# Patient Record
Sex: MALE | Race: WHITE
[De-identification: names, ages, dates, MRNs, and addresses within clinical notes are randomized per-mention and may not be internally consistent; named-entity substitution may affect disease eponyms.]

---

## 2018-07-07 ENCOUNTER — HOSPITAL ENCOUNTER (EMERGENCY)
Dept: HOSPITAL 97 - ER | Age: 58
Discharge: ANOTHER HOSPITAL | End: 2018-07-07
Payer: COMMERCIAL

## 2018-07-07 DIAGNOSIS — I44.7: Primary | ICD-10-CM

## 2018-07-07 DIAGNOSIS — Z79.01: ICD-10-CM

## 2018-07-07 DIAGNOSIS — I10: ICD-10-CM

## 2018-07-07 DIAGNOSIS — Z95.818: ICD-10-CM

## 2018-07-07 DIAGNOSIS — G93.1: ICD-10-CM

## 2018-07-07 DIAGNOSIS — E66.9: ICD-10-CM

## 2018-07-07 DIAGNOSIS — I50.30: ICD-10-CM

## 2018-07-07 DIAGNOSIS — I25.2: ICD-10-CM

## 2018-07-07 LAB
ALBUMIN SERPL BCP-MCNC: 3.8 G/DL (ref 3.4–5)
ALP SERPL-CCNC: 83 U/L (ref 45–117)
ALT SERPL W P-5'-P-CCNC: 204 U/L (ref 12–78)
AST SERPL W P-5'-P-CCNC: 124 U/L (ref 15–37)
BUN BLD-MCNC: 19 MG/DL (ref 7–18)
CKMB CREATINE KINASE MB: 1.7 NG/ML (ref 0.3–3.6)
COHGB MFR BLDA: 0.4 % (ref 0–1.5)
GLUCOSE SERPLBLD-MCNC: 169 MG/DL (ref 74–106)
HCT VFR BLD CALC: 50.3 % (ref 39.6–49)
INR BLD: 1.01
LYMPHOCYTES # SPEC AUTO: 4.5 K/UL (ref 0.7–4.9)
MAGNESIUM SERPL-MCNC: 2.3 MG/DL (ref 1.8–2.4)
MCH RBC QN AUTO: 29.2 PG (ref 27–35)
MCV RBC: 87.8 FL (ref 80–100)
NT-PROBNP SERPL-MCNC: 1487 PG/ML (ref ?–125)
OXYHGB MFR BLDA: 90.7 % (ref 94–97)
PMV BLD: 8.4 FL (ref 7.6–11.3)
POTASSIUM SERPL-SCNC: 4.5 MMOL/L (ref 3.5–5.1)
RBC # BLD: 5.73 M/UL (ref 4.33–5.43)
SAO2 % BLDA: 92 % (ref 92–98.5)

## 2018-07-07 PROCEDURE — 99291 CRITICAL CARE FIRST HOUR: CPT

## 2018-07-07 PROCEDURE — 85730 THROMBOPLASTIN TIME PARTIAL: CPT

## 2018-07-07 PROCEDURE — 82550 ASSAY OF CK (CPK): CPT

## 2018-07-07 PROCEDURE — 85610 PROTHROMBIN TIME: CPT

## 2018-07-07 PROCEDURE — 82805 BLOOD GASES W/O2 SATURATION: CPT

## 2018-07-07 PROCEDURE — 81003 URINALYSIS AUTO W/O SCOPE: CPT

## 2018-07-07 PROCEDURE — 96375 TX/PRO/DX INJ NEW DRUG ADDON: CPT

## 2018-07-07 PROCEDURE — 96365 THER/PROPH/DIAG IV INF INIT: CPT

## 2018-07-07 PROCEDURE — 83880 ASSAY OF NATRIURETIC PEPTIDE: CPT

## 2018-07-07 PROCEDURE — 80076 HEPATIC FUNCTION PANEL: CPT

## 2018-07-07 PROCEDURE — 36415 COLL VENOUS BLD VENIPUNCTURE: CPT

## 2018-07-07 PROCEDURE — 85025 COMPLETE CBC W/AUTO DIFF WBC: CPT

## 2018-07-07 PROCEDURE — 51702 INSERT TEMP BLADDER CATH: CPT

## 2018-07-07 PROCEDURE — 80048 BASIC METABOLIC PNL TOTAL CA: CPT

## 2018-07-07 PROCEDURE — 83735 ASSAY OF MAGNESIUM: CPT

## 2018-07-07 PROCEDURE — 93005 ELECTROCARDIOGRAM TRACING: CPT

## 2018-07-07 PROCEDURE — 82553 CREATINE MB FRACTION: CPT

## 2018-07-07 PROCEDURE — 71045 X-RAY EXAM CHEST 1 VIEW: CPT

## 2018-07-07 PROCEDURE — 84484 ASSAY OF TROPONIN QUANT: CPT

## 2018-07-07 PROCEDURE — 70450 CT HEAD/BRAIN W/O DYE: CPT

## 2018-07-07 NOTE — XMS REPORT
Clinical Summary

 Created on:2018



Patient:Gregory Sood

Sex:Male

:1960

External Reference #:[id number]





Demographics







 Address  [address] [place] [id number]

 

 Mobile Phone  [phone]

 

 Home Phone  [phone]

 

 Email Address  [email or web address]

 

 Preferred Language  English

 

 Marital Status  Unknown

 

 Jewish Affiliation  Unknown

 

 Race  White

 

 Ethnic Group  Not  or 









Author







 Organization  [facility]

 

 Address  [address]



   [place] [address]

 

 Phone  [phone]









Support







 Name  Relationship  Address  Phone

 

 Gregory Sood  Unavailable  [address]  [phone]



     [place] [address]  

 

 Gregory Sood  Unavailable  [address] [place] [address]  [phone]



     [place] [address]  









Care Team Providers







 Name  Role  Phone

 

 Unavailable  Primary Care Provider  Unavailable









Allergies

No Known Allergies



Current Medications







 Prescription  Sig.  Disp.  Refills  Start Date  End Date  Status

 

 potassium chloride  Take 1 tablet (20  30 tablet  0  2018    Active



 SA  mEq total) by          



 (K-DUR,KLOR-CON)  mouth daily.          



 20 MEQ tablet            

 

 fluticasone-salmet  Inhale 1 puff by  1 Inhaler  0  2018    Active



 neil (ADVAIR)  mouth via inhaler          



 100-50 mcg/dose  every 12 (twelve)          



 diskus inhaler  hours.          

 

 aspirin 81 MG EC  Take 1 tablet (81  30 tablet  0  2018  
Active



 tablet  mg total) by        9  



   mouth daily.          

 

 atorvastatin  Take 1 tablet (80  30 tablet  0  06/15/2018  06/15/201  Active



 (LIPITOR) 80 MG  mg total) by        9  



 tablet  mouth nightly.          

 

 carvedilol (COREG)  Take 1 tablet  60 tablet  0  06/15/2018  06/15/201  Active



 12.5 MG tablet  (12.5 mg total)        9  



   by mouth 2 (two)          



   times daily.          

 

 eplerenone  Take 1 tablet (25  30 tablet  0  2018  Active



 (INSPRA) 25 MG  mg total) by        9  



 tablet  mouth daily.          

 

 furosemide (LASIX)  Take 1 tablet (20  30 tablet  0  2018  
Active



 20 MG tablet  mg total) by        9  



   mouth daily.          

 

 lisinopril  Take 1 tablet (20  30 tablet  0  2018  Active



 (PRINIVIL,ZESTRIL)  mg total) by        9  



 20 MG tablet  mouth daily.          

 

 ticagrelor  Take 1 tablet (90  60 tablet  0  06/15/2018    Active



 (BRILINTA) 90 mg  mg total) by          



 Tab tablet  mouth 2 (two)          



   times daily.          

 

 oseltamivir  Take 75 mg by          Discontinued



 (TAMIFLU) 75 MG  mouth 2 (two)        8  



 capsule  times daily.          

 

 azithromycin  Take 1 tablet  5 tablet  0  2018  



 (ZITHROMAX) 500 MG  (500 mg total) by        8  



 tablet  mouth daily for 5          



   days Antibiotics          



   for pneumonia.          

 

 lisinopril  Take 1 tablet (10  30 tablet  0  2018  06/15/201  
Discontinued



 (PRINIVIL,ZESTRIL)  mg total) by        8  



 10 MG tablet  mouth daily For          



   blood pressure,          



   heart.          

 

 aspirin 81 MG EC  Take 1 tablet (81  30 tablet  0  2018  
Discontinued



 tablet  mg total) by        8  



   mouth daily.          

 

 atorvastatin  Take 1 tablet (40  30 tablet  0  2018  06/15/201  
Discontinued



 (LIPITOR) 40 MG  mg total) by        8  



 tablet  mouth nightly For          



   cholesterol.          

 

 furosemide (LASIX)  Take 1 tablet (20  60 tablet  0  2018  06/15/201  
Discontinued



 20 MG tablet  mg total) by        8  



   mouth 2 (two)          



   times daily Water          



   pill.          

 

 predniSONE  Take 1 tablet (20  3 tablet  0  2018  



 (DELTASONE) 20 MG  mg total) by        8  



 tablet  mouth daily for 3          



   days Steroids for          



   lungs.          

 

 mometasone-formote  Inhale 2 puffs by  1 Inhaler  0  2018  
Discontinued



 rol (DULERA) 200-5  mouth via inhaler        8  



 mcg/actuation  2 (two) times          



 inhaler  daily.          

 

 codeine-guaifenesi  Take 10 mLs by  120 mL  0  2018  



 n (GUAIFENESIN AC)  mouth every 6        8  



  mg/5 mL  (six) hours as          



 liquid  needed for Cough          



   for up to 10          



   days. Max Daily          



   Amount: 40 mLs          







Active Problems







 Problem  Noted Date

 

 NIKKY (obstructive sleep apnea)  2018









 Overview: 



Last Assessment & Plan:



 Pt with NIKKY on HST with  obesity , hyepertension, daytime sleepiness, non 
restorative sleep



 Discussed with patient the pathophysiology of sleep apnea,  and treatment 
options



 Pt agreeable to use CPAP, will order Auto-CPAP @ 7-18



 Will follow up with download in 6 weeks, to confirm compliance and adequacy of 
current settings



 Pt to call if any difficulties with CPAP use









 NSTEMI (non-ST elevated myocardial infarction) ([facility])  2018

 

 Coronary artery disease involving native coronary artery of native heart  



 without angina pectoris  

 

 Hypertension, essential  2018

 

 Systolic and diastolic CHF, chronic ([facility])  2018

 

 Tobacco abuse  2018

 

 Severe obesity (BMI 35.0-39.9) ([facility])  2018







Resolved Problems







 Problem  Noted Date  Resolved Date

 

 Chest pain, unspecified type  2018

 

 Multifocal pneumonia  2018

 

 Infection due to human metapneumovirus (hMPV)  2018







Encounters







 Date  Type  Specialty  Care Team  Description

 

 2018  Office Visit  Cardiology  Funmilayo Ledesma Chronic combined



       NP  systolic (congestive)



         and diastolic



         (congestive) heart



         failure ([facility]) (Primary



         Dx)

 

 2018  Procedure Pass      

 

 2018  Surgery    Rosendo Vazquez L CATH & PCI



       MD  

 

 2018 -  [facility]  Cardiology  Arjun White  Chest pain, unspecified



 06/15/2018  Encounter    MD Armen



  type (Primary



       Rosendo Vazquez,  Dx);Coronary artery



       MD



  disease involving



       [facility]  [facility]



       D  of native heart without



         angina



         pectoris;Hypertension,



         essential;Severe



         obesity (BMI 35.0-39.9)



         ([facility]);Tobacco



         abuse;Acute on chronic



         systolic heart failure



         ([facility]);NIKKY (obstructive



         sleep apnea)

 

 2018  Orders Only  General Internal    



     Medicine    

 

 2018 -  [facility]  Cardiology  Arjun White  Severe persistent



 2018  Encounter    MD Armen



  asthma with acute



       Rodriguez,  exacerbation (Primary



       Vicenta Soto,  Dx);Upper respiratory



       MD



  tract infection,



       Daren Andrews,  unspecified type;Acute



       MD  on chronic systolic



         heart failure



         ([facility]);Infection due to



         human metapneumovirus



         (hMPV);Multifocal



         pneumonia;Chronic



         obstructive pulmonary



         disease, unspecified



         COPD type



         ([facility]);Hypertension,



         essential;Severe



         obesity (BMI 35.0-39.9)



         ([facility])

 

 2018  Orders Only  General Internal    



     Medicine    



after 2017



Social History







 Tobacco Use  Types  Packs/Day  Years Used  Date

 

 Former Smoker        Quit: 2003









 Smokeless Tobacco: Former User      









 Comments: quitted 12 years ago









 Alcohol Use  Drinks/Week  oz/Week  Comments

 

 Yes      rarely









 Sex Assigned at Birth  Date Recorded

 

 Not on file  







Last Filed Vital Signs







 Vital Sign  Reading  Time Taken

 

 Blood Pressure  125/76  2018  3:27 PM CDT

 

 Pulse  70  2018  3:27 PM CDT

 

 Temperature  35.9 C (96.7 F)  2018  3:27 PM CDT

 

 Respiratory Rate  16  2018  3:27 PM CDT

 

 Oxygen Saturation  98%  2018  3:27 PM CDT

 

 Inhaled Oxygen Concentration  -  -

 

 Weight  124.9 kg (275 lb 4.8 oz)  2018  3:27 PM CDT

 

 Height  182.9 cm (6')  2018  3:27 PM CDT

 

 Body Mass Index  37.34  2018  3:27 PM CDT







Plan of Treatment







 Health Maintenance  Due Date  Last Done  Comments

 

 INFLUENZA VACCINE  10/01/2018    







Implants







 Implanted  Type  Area    Device  Expiration  Model /



         Identifier  Date  Serial /



             Lot

 

 Promus Premier  Stents-C  N/A:  [place]    2019  [id number] /



 Implanted: Qty: 1 on 2018 by Rosendo Vazquez MD  [employer]  Coronary  
Molecule Software       /



             [id number]

 

 Promus Premier  Stents-C  N/A:  [place]    10/02/2018  [id number] /



 Implanted: Qty: 1 on 2018 by Rosendo Vazquez MD  [facility]  Coronary  
Molecule Software       /



             [id number]

 

 Promus Premier  Stents-C  N/A:  [place]    2019  [id number] /



 Implanted: Qty: 1 on 2018 by Rosendo Vazquez MD  [facility] [employer]  Coronary  
M[employer]       /



             [id number]

 

 Promus Premier  Stents-C  N/A:  [place]    2019  [id number] /



 Implanted: Qty: 1 on 2018 by Rosendo Vazquez MD  [facility] [employer]  Coronary  
Molecule Software       /



             [id number]







Procedures







 Procedure Name  Priority  Date/Time  Associated Diagnosis  Comments

 

 L CATH & PCI    2018  9:31 AM  NSTEMI (non-ST  



     CDT  elevated myocardial  



       infarction) (HCC)  

 

 CRITICAL CARE  Routine  2018  4:37 PM    Results for this



     CDT    procedure are in the



         results section.



after 2017



Results

VASCULAR DIAGRAM -SCAN (2018  3:41 PM)Only the most recent of2 
resultswithin the time period is included.RHYTHM STRIP - SCAN (2018  6:20 
AM)Only the most recent of3 resultswithin the time period is included.CBC (
Hemogram only) (06/15/2018  3:20 AM)Only the most recent of4 resultswithin the 
time period is included.





 Component  Value  Ref Range

 

 WBC  7.8  3.5 - 10.5 K/L

 

 RBC  4.80  4.63 - 6.08 M/L

 

 Hemoglobin  14.0  13.7 - 17.5 GM/DL

 

 Hematocrit  41.0  40.1 - 51.0 %

 

 MCV  85.4  79.0 - 92.2 fL

 

 MCH  29.2  25.7 - 32.2 pg

 

 MCHC  34.1  32.3 - 36.5 GM/DL

 

 RDW  13.0  11.6 - 14.4 %

 

 Platelets  227  150 - 450 K/CU MM

 

 MPV  10.1  9.4 - 12.4 fL

 

 nRBC  0  0 - 0 /100 WBC









 Specimen  Performing Laboratory

 

 Blood - Arm, [address] [address]



Magnesium (06/15/2018  3:20 AM)Only the most recent of6 resultswithin the time 
period is included.





 Component  Value  Ref Range

 

 Magnesium  2.3  1.6 - 2.6 mg/dL









 Specimen  Performing Laboratory

 

 Blood - Arm, [address] [address]



Basic Metabolic Panel (06/15/2018  3:20 AM)Only the most recent of8 
resultswithin the time period is included.





 Component  Value  Ref Range

 

 Sodium  139  136 - 145 meq/L

 

 Potassium  3.8  3.5 - 5.1 meq/L

 

 Chloride  106  98 - 107 meq/L

 

 CO2  24  22 - 29 meq/L

 

 BUN  28 (H)  7 - 21 mg/dL

 

 Creatinine  0.86  0.57 - 1.25 mg/dL

 

 Glucose  123 (H)  70 - 105 mg/dL

 

 Calcium  9.1  8.4 - 10.2 mg/dL

 

 EGFR  91Comment: ESTIMATED GFR IS NOT AS ACCURATE AS  mL/min/1.73 sq m



   CREATININE CLEARANCE IN PREDICTING GLOMERULAR FILTRATION  



   RATE. ESTIMATED GFR IS NOT APPLICABLE FOR DIALYSIS  



   PATIENTS.  









 Specimen  Performing Laboratory

 

 Blood - Arm, Right  [address] [address]



CARDIAC CATH REPORT - SCAN (2018  1:01 PM)POC-Glucose meter (2018 10
:06 PM)





 Component  Value  Ref Range

 

 POC-Glucose Meter  91Comment: TESTED AT [address]  70 - 
110 mg/dL



   [id number]  









 Specimen  Performing Laboratory

 

 Blood  [address] [address]



ECHOCARDIOGRAM REPORT - SCAN (2018  6:50 PM)Only the most recent of2 
resultswithin the time period is included.POC ACTIVATED CLOTTING TIME (2018  3:19 PM)Only the most recent of5 resultswithin the time period is 
included.





 Component  Value  Ref Range

 

 Activated Clotting Time  136Comment: TESTED AT [address]  
sec



   [id number]  









 Specimen  Performing Laboratory

 

 Blood  [address] [address]



2D Echo W/Doppler(CW/PW/Color) (2018  2:45 PM)Only the most recent of2 
resultswithin the time period is included.





 Component  Value  Ref Range

 

 Ejection Fraction    









 Specimen  Performing Laboratory

 

   [facility] ECHO HEARTLAB MKCKESSON CPACS









 Narrative

 

 Transthoracic Echocardiography Report (TTE)







  







  Demographics







  







  Patient Name GREGORY SOOD Date of Study 2018







 SARA







  







  [id number]



 GenderMale







  







  Visit Number [id number]



 RaceCaucasian







  







  [id number] Number [id number]







  Number







  







  Date of Birth1960 Referring Physician IBAN VENEGAS







  







  Age59 year(s) Sonographer 
Misha Lopez







  







  AnalystAlex ZadeInterpreting
Kushal Land,







 



 Physician MD







  







 Procedure







  







  Type of







  Study TTE procedure:2DECHO W DOPPLER(CW/PW/COLOR) (Routine)







  







 Indications:Evaluation of Ventricular function post ACS.







  







 Clinical History







 HLD







 HTN







 CAD







 MI ()







 CARDIAC STENTS X2 ()







 S/P CARDIAC STENTS X4 (18)







  







 Contrast Medium: Definity.







  







 Height: 72 inches Weight: 119.75 kg (264 lbs) BSA: 2.4 m^2 BMI: 35.8 kg/m^2







  







 HR: 76 bpm BP: 158/92 mmHg







  







  Summary







  The left ventricle is chamber size (by PSLAX dimension) is normal (male -







  LVIDd 4.2-5.8cm) . Mild concentric LV hypertrophy. The following







  segment(s) appear akinetic: apex, inferior wall . The other segments are







  hypokinetic. Global LV systolic function moderate-to-severely reduced .







  Estimated LVEF by qualitative assessment is moderately reduced (30-34%) .







  LV endocardium is adequately visualized with IV ultrasound enhancing







  agent.







  No evidence of pericardial effusion.







  Unable to estimate peak systolic PA pressure; inadequate TR velocity







  signal.







  The estimated RA pressure by IVC dynamics 0-5mmHg .







  







  Signature







  







  ----------------------------------------------------------------







  Electronically signed by Kushal Land MD(Interpreting







  physician) on 2018 06:08 PM







  ----------------------------------------------------------------







  







  Findings







  







  Left Ventricle The left ventricle is chamber size (by PSLAX







 dimension) is normal (male - 
LVIDd



 4.2-5.8cm) .







 Mild concentric LV 
hypertrophy. The



 following







 segment(s) appear akinetic: 
apex,



 inferior wall .







 The other segments are 
hypokinetic.



 Global LV







 systolic function



 moderate-to-severely reduced .







 Estimated LVEF by qualitative



 assessment is







 moderately reduced (30-34%) .







 LV endocardium is adequately



 visualized with IV







 ultrasound enhancing agent.







  







  Left AtriumLA size is normal .







  







  Right VentricleNormal right ventricle structure and function.







  







  Right Atrium Normal right atrium.







  







  Aortic Valve Mild AoV cusp thickening.







  







  Mitral Valve Mild MV leaflet thickening.







  







  Tricuspid ValveA trace of tricuspid regurgitation.







 Unable to estimate peak 
systolic PA



 pressure;







 inadequate TR velocity signal.







  







  AortaAortic root size (SInus of Valsalva



 diameter) is







 normal .







  







  PericardiumNo evidence of pericardial effusion.







  







  IVC/SVC/PA/PV/PleuralThe estimated RA pressure by IVC dynamics 0-5mmHg .







  







 Chambers/Structures







  







  Left Atrium







  







  LA Dimension: 3.54 cmLA Area: 17.35 cm^2







  LA Volume: 47.18 ml







  LA Vol. Index: 20 ml/m^2







  







  Left Ventricle







  







  LVIDd: 5.36 cm







  LV Septum Diastolic: 1.42 cm







  LV PW Diastolic: 1.31 cm







  







  LVOT Diameter: 2.08 cm







  







 Aorta







  







  Ao Root S of Aarti.: 3.34 cm







  







 Doppler/Quantitative Measurements







  







  LVOT







  







  Peak Velocity: 1.26 m/s Peak Gradient: 6.31 mmHg







  Mean Velocity: 0.68 m/s Mean Gradient: 2.3 mmHg







  LVOT Diameter: 2.08 cmLVOT VTI: 16.77 cm







  LVOT Area: 3.4 cm^2 LVOT SV:56.95 ml







  LVOT CO: 4.33 l/min LVOT CI: 1.8 l/min/m^2







 









 Procedure Note

 

 Interface, External Ris In - 2018  6:09 PM CDT



Transthoracic Echocardiography Report (TTE)



 



  Demographics



 



  Patient Name   GREGORY SOOD     Date of Study       2018



                 SARA



 



  MRN            [id number]         Gender              Male



 



  Visit Number   [id number]       Race                



 



  Accession      [id number]        Room Number         C821



  Number



 



  Date of Birth  1960       Referring Physician IBAN VENEGAS



 



  Age            58 year(s)       Sonographer         Misha Lopez



 



  Analyst        Magdiel Pierce        Interpreting        Kushal Land,



                                  Physician           MD



 



 Procedure



 



  Type of



  Study     TTE procedure:2DECHO W DOPPLER(CW/PW/COLOR) (Routine)



 



 Indications:Evaluation of Ventricular function post ACS.



 



 Clinical History



 HLD



 HTN



 CAD



 MI ()



 CARDIAC STENTS X2 ()



 S/P CARDIAC STENTS X4 (18)



 



 Contrast Medium: Definity.



 



 Height: 72 inches Weight: 119.75 kg (264 lbs) BSA: 2.4 m^2 BMI: 35.8 kg/m^2



 



 HR: 76 bpm BP: 158/92 mmHg



 



  Summary



  The left ventricle is chamber size (by PSLAX dimension) is normal (male -



  LVIDd 4.2-5.8cm) . Mild concentric LV hypertrophy. The following



  segment(s) appear akinetic: apex, inferior wall . The other segments are



  hypokinetic. Global LV systolic function moderate-to-severely reduced .



  Estimated LVEF by qualitative assessment is moderately reduced (30-34%) .



  LV endocardium is adequately visualized with IV ultrasound enhancing



  agent.



  No evidence of pericardial effusion.



  Unable to estimate peak systolic PA pressure; inadequate TR velocity



  signal.



  The estimated RA pressure by IVC dynamics 0-5mmHg .



 



  Signature



 



  ----------------------------------------------------------------



  Electronically signed by Kushal Land MD(Interpreting



  physician) on 2018 06:08 PM



  ----------------------------------------------------------------



 



  Findings



 



  Left Ventricle         The left ventricle is chamber size (by PSLAX



                         dimension) is normal (male - LVIDd 4.2-5.8cm) .



                         Mild concentric LV hypertrophy. The following



                         segment(s) appear akinetic: apex, inferior wall .



                         The other segments are hypokinetic. Global LV



                         systolic function moderate-to-severely reduced .



                         Estimated LVEF by qualitative assessment is



                         moderately reduced (30-34%) .



                         LV endocardium is adequately visualized with IV



                         ultrasound enhancing agent.



 



  Left Atrium            LA size is normal .



 



  Right Ventricle        Normal right ventricle structure and function.



 



  Right Atrium           Normal right atrium.



 



  Aortic Valve           Mild AoV cusp thickening.



 



  Mitral Valve           Mild MV leaflet thickening.



 



  Tricuspid Valve        A trace of tricuspid regurgitation.



                         Unable to estimate peak systolic PA pressure;



                         inadequate TR velocity signal.



 



  Aorta                  Aortic root size (SInus of Valsalva diameter) is



                         normal .



 



  Pericardium            No evidence of pericardial effusion.



 



  IVC/SVC/PA/PV/Pleural  The estimated RA pressure by IVC dynamics 0-5mmHg .



 



 Chambers/Structures



 



  Left Atrium



 



  LA Dimension: 3.54 cm                  LA Area: 17.35 cm^2



  LA Volume: 47.18 ml



  LA Vol. Index: 20 ml/m^2



 



  Left Ventricle



 



  LVIDd: 5.36 cm



  LV Septum Diastolic: 1.42 cm



  LV PW Diastolic: 1.31 cm



 



  LVOT Diameter: 2.08 cm



 



 Aorta



 



  Ao Root S of Aarti.: 3.34 cm



 



 Doppler/Quantitative Measurements



 



  LVOT



 



  Peak Velocity: 1.26 m/s             Peak Gradient: 6.31 mmHg



  Mean Velocity: 0.68 m/s             Mean Gradient: 2.3 mmHg



  LVOT Diameter: 2.08 cm              LVOT VTI: 16.77 cm



  LVOT Area: 3.4 cm^2                 LVOT SV:56.95 ml



  LVOT CO: 4.33 l/min                 LVOT CI: 1.8 l/min/m^2



 



PT/aPTT (2018  5:14 AM)Only the most recent of3 resultswithin the time 
period is included.





 Component  Value  Ref Range

 

 Protime  14.0  11.7 - 14.7 seconds

 

 INR  1.1  <=5.9

 

 PTT  49.4 (H)  22.5 - 36.0 seconds









 Specimen  Performing Laboratory

 

 [place] [address]









 Narrative

 

  







 RECOMMENDED COUMADIN/WARFARIN INR THERAPY RANGES







 STANDARD DOSE: 2.0 - 3.0 Includes: PROPHYLAXIS for venous thrombosis, 
systemic



 embolization; TREATMENT for venous thrombosis and/or pulmonary embolus.







 HIGH RISK: Target INR is 2.5-3.5 for patients with mechanical heart valves.



Troponin I (2018  5:14 AM)Only the most recent of6 resultswithin the time 
period is included.





 Component  Value  Ref Range

 

 Troponin I  1.57 (HH)  0.00 - 0.03 ng/mL









 Specimen  Performing Laboratory

 

 Blood  [address] [address]









 Narrative

 

  







 Troponin I (TnI) levels must be interpreted in the context of the presenting 
symptoms



 and the clinical findings. Elevated TnI levels indicate myocardial damage, but 
are



 not specific for ischemic heart disease. Elevated TnI levels are seen in 
patients



 with other cardiac conditions (including myocarditis and congestive heart 
failure),



 and slight TnI elevations occur in patients with other conditions, including 
sepsis,



 renal failure, acidosis, acute neurological disease, and persistent 
tachyarrhythmia.



Critical Care (2018  4:37 PM)





 Narrative

 

 Arjun White MD 20184:37 PM







 Critical Care







 Performed by: ARJUN WHITE







 Authorized by: ARJUN WHITE 







 Total critical care time: 42 minutes







 Critical care time was exclusive of separately billable procedures and 







 treating other patients and teaching time.







 Critical care was necessary to treat or prevent imminent or 







 life-threatening deterioration of the following conditions: circulatory 







 failure and cardiac failure.







 Critical care was time spent personally by me on the following activities: 







 development of treatment plan with patient or surrogate, discussions with 







 consultants, discussions with primary provider, interpretation of cardiac 







 output measurements, evaluation of patient's response to treatment, 







 examination of patient, obtaining history from patient or surrogate, 







 ordering and performing treatments and interventions, ordering and review 







 of laboratory studies, ordering and review of radiographic studies, pulse 







 oximetry, re-evaluation of patient's condition and review of old charts.







 



XR chest 1 view portable/bedside (2018  3:40 PM)Only the most recent of2 
resultswithin the time period is included.





 Specimen  Performing Laboratory

 

   [facility]









 Narrative

 

 FINAL REPORT







  







 PATIENT ID: [id number]







  







 Chest, 1 view, 2018 3:41 PM.







  







  







 History: Chest pain.







  







 Comparison: 2018.







  







 Discussion:The cardiomediastinal silhouette and pulmonary 







 vasculature are within normal limits for a portable exam. The lungs 







 are clear without evidence of consolidation or effusion.Multiple 







 old left rib fractures are again noted.







  







  







 IMPRESSION: 







 No acute cardiopulmonary abnormality.







  







 Signed: Arjun Monet MD







 Report Verified Date/Time:2018 15:49:11







  







 Reading Location: [facility] Reading Room







  Electronically signed by: ARJUN MONET M.D. on 2018 03:49 PM







 









 Procedure Note

 

 Interface, External Ris In - 2018  3:51 PM CDT



FINAL REPORT



 



 PATIENT ID:   [id number]



 



 Chest, 1 view, 2018 3:41 PM.



 



 



 History: Chest pain.



 



 Comparison: 2018.



 



 Discussion:  The cardiomediastinal silhouette and pulmonary



 vasculature are within normal limits for a portable exam. The lungs



 are clear without evidence of consolidation or effusion.  Multiple



 old left rib fractures are again noted.



 



 



 IMPRESSION:



 No acute cardiopulmonary abnormality.



 



 Signed: Arjun Monet MD



 Report Verified Date/Time:  2018 15:49:11



 



 Reading Location: [facility] Reading Room



      Electronically signed by: ARJUN MONET M.D. on 2018 03:49 PM



 



CBC with platelet count + automated diff (2018  3:03 PM)Only the most 
recent of4 resultswithin the time period is included.





 Component  Value  Ref Range

 

 WBC  7.5  3.5 - 10.5 K/L

 

 RBC  5.54  4.63 - 6.08 M/L

 

 Hemoglobin  16.0  13.7 - 17.5 GM/DL

 

 Hematocrit  47.4  40.1 - 51.0 %

 

 MCV  85.6  79.0 - 92.2 fL

 

 MCH  28.9  25.7 - 32.2 pg

 

 MCHC  33.8  32.3 - 36.5 GM/DL

 

 RDW  13.1  11.6 - 14.4 %

 

 Platelets  258  150 - 450 K/CU MM

 

 MPV  9.6  9.4 - 12.4 fL

 

 nRBC  0  0 - 0 /100 WBC

 

 % Neutros  57  %

 

 % Lymphs  31  %

 

 % Monos  9  %

 

 % Eos  2  %

 

 % Baso  1  %

 

 # Neutros  4.30  1.78 - 5.38 K/L

 

 # Lymphs  2.36  1.32 - 3.57 K/L

 

 # Monos  0.69  0.30 - 0.82 K/L

 

 # Eos  0.12  0.04 - 0.54 K/L

 

 # Baso  0.05  0.01 - 0.08 K/L

 

 Immature Granulocytes-Relative  0  0 - 1 %









 Specimen  Performing Laboratory

 

 Blood - Arm, [address] [address]



CBC with platelet count + automated diff (2018  3:03 PM)Only the most 
recent of4 resultswithin the time period is included.





 Specimen  Performing Laboratory

 

 Blood  









 Narrative

 

 The following orders were created for panel order CBC with platelet count + 
automated



 diff.







 Procedure



 Abnormality Status 







 ---------



 ----------- ------ 







 CBC with platelet count



 ...[[id number]]Final



 result 







  







 Please view results for these tests on the individual orders.



B-type Natriuretic Factor (BNP) (2018  3:03 PM)Only the most recent of2 
resultswithin the time period is included.





 Component  Value  Ref Range

 

 BNP  148 (H)  0 - 100 pg/mL









 Specimen  Performing Laboratory

 

 Blood - Arm, [address] [address]



ECG 12 lead (2018  2:39 PM)Only the most recent of2 resultswithin the 
time period is included.





 Specimen  Performing Laboratory

 

   [facility]









 Narrative

 

 Ventricular Rate 76 BPM







 Atrial Rate 76 BPM







 P-R Interval 182 ms







 QRS Duration 160 ms







 Q-T Interval 442 ms







 QTC Calculation(Bazett) 497 ms







 P Axis 41 degrees







 R Axis -28 degrees







 T Axis 180 degrees







  







 Normal sinus rhythm







 Left bundle branch block







 Prolonged QT







 Abnormal ECG







 When compared with ECG of 2018 17:38,







 Ventricular rate has decreased from 110 bpm







 ST more elevated now anterior leads







 Confirmed by MD CLARK YOCHAI () on 2018 6:25:26 AM









 Procedure Note

 

 Interface, External Ris In - 2018  6:25 AM CDT



Ventricular Rate 76 BPM



 Atrial Rate 76 BPM



 P-R Interval 182 ms



 QRS Duration 160 ms



 Q-T Interval 442 ms



 QTC Calculation(Bazett) 497 ms



 P Axis 41 degrees



 R Axis -28 degrees



 T Axis 180 degrees



 



 Normal sinus rhythm



 Left bundle branch block



 Prolonged QT



 Abnormal ECG



 When compared with ECG of 2018 17:38,



 Ventricular rate has decreased from 110 bpm



 ST more elevated now anterior leads



 Confirmed by MD CLARK YOCHAI () on 2018 6:25:26 AM



Sputum Culture + Gram Stain (2018 10:15 AM)





 Component  Value  Ref Range

 

 Result  Oropharyngeal contamination, specimen rejected. Recollect  



   requested.  

 

 Gram Stain Result  1+ WBCs  

 

 Gram Stain Result  2+ gram variable coccobacilli  









 Specimen  Performing Laboratory

 

 Sputum - Expectorated  [address] [address]



Hepatic function panel (2018  4:28 AM)





 Component  Value  Ref Range

 

 Protein, Total  6.6  6.0 - 8.3 gm/dL

 

 Albumin  3.6  3.5 - 5.0 g/dL

 

 Total Bilirubin  0.6  0.2 - 1.2 mg/dL

 

 Bilirubin, Direct  0.2  0.1 - 0.5 mg/dL

 

 Alkaline Phosphatase  43  40 - 150 U/L

 

 AST  51 (H)  5 - 34 U/L

 

 ALT  62 (H)  6 - 55 U/L









 Specimen  Performing Laboratory

 

 Blood - Arm, Left  [address] [address]



Creatine Kinase (CK), Total and MB (2018  5:26 AM)Only the most recent 
of3 resultswithin the time period is included.





 Component  Value  Ref Range

 

 Total CK  428 (H)  29 - 200 U/L

 

 CK-MB  1.0  0.0 - 6.6 ng/mL

 

 MB Relative Index  0.2  %









 Specimen  Performing Laboratory

 

 Blood - Arm, Right  [address] [address]









 Narrative

 

 CK-MB Reference Range:







 <6.7Normal







 6.7-10.0Borderline







 >10.0 Abnormal



Respiratory Panel [facility] (2018  2:33 AM)





 Component  Value  Ref Range

 

 Human Metapneumovirus  Detected (A)  Not detected, Inconclusive

 

 Rhinovirus  Not detected  Not detected, Inconclusive

 

 Influenza A  Not detected  Not detected, Inconclusive

 

 Influenza A subtype H1  Not detected  Not detected, Inconclusive

 

 Influenza A Subtype H3  Not detected  Not detected, Inconclusive

 

 Influenza A Subtype H1-2009  Not detected  Not detected, Inconclusive

 

 Influenza B  Not detected  Not detected, Inconclusive

 

 Respiratory Syncytial Virus  Not detected  Not detected, Inconclusive

 

 Parainfluenza Virus 1  Not detected  Not detected, Inconclusive

 

 Parainfluenza Virus 2  Not detected  Not detected, Inconclusive

 

 Parainfluenza virus 3  Not detected  Not detected, Inconclusive

 

 Parainfluenza Virus 4  Not detected  Not detected, Inconclusive

 

 Adenovirus  Not detected  Not detected, Inconclusive

 

 Coronavirus 229E  Not detected  Not detected, Inconclusive

 

 Coronavirus HKU1  Not detected  Not detected, Inconclusive

 

 Coronavirus NL63  Not detected  Not detected, Inconclusive

 

 Coronavirus OC43  Not detected  Not detected, Inconclusive

 

 Bordetella Pertussis  Not detected  Not detected, Inconclusive

 

 Chlamydophila Pneumoniae  Not detected  Not detected, Inconclusive

 

 Mycoplasma Pneumoniae  Not detected  Not detected, Inconclusive









 Specimen  Performing Laboratory

 

 Nasopharyngeal  [address] [address]



CT chest for pulmonary embolus (2018 12:48 AM)





 Specimen  Performing Laboratory

 

   [employer]









 Narrative

 

 FINAL REPORT







  







 PATIENT ID: [id number]







  







 CT, CHEST WITH IV CONTRAST- PE TEST DESIGN







  







 INDICATION: "dyspnea"







  







 COMPARISON: None







  







 TECHNIQUE: Contrast enhanced CT examination of the chest in the 







 pulmonary arterial phase from the bases to the apices. Orthogonal 







 reformatted images as well as coronal maximum intensity projection 







 images were obtained. 







  







 DOSE REDUCTION: Dose modulation, iterative reconstruction, and/or 







 weight-based adjustment of the mA/kV was utilized to reduce the 







 radiation dose to as low as reasonably achievable.







  







 FINDINGS: 







 No pulmonary embolism.







 Cardiomegaly.







 No pathologic adenopathy in the chest per CT size criteria.







  







 Multiple tree-in-bud opacities in the lungs, worse in the inferior 







 portion of the right upper lobe and left upper lobe. More 







 consolidative changes present in the left upper lobe with air 







 bronchograms. Findings are compatible with a multifocal 







 bronchopneumonia.







  







 Visualized portion of the upper abdomen shows no acute abnormality.







 Hepatic steatosis.







  







 Multiple remote left-sided rib fractures.







  







 IMPRESSION:







 No pulmonary embolism.







 Multifocal bilateral bronchopneumonia.







  







 Signed: Arnulfo Batista MD







 Report Verified Date/Time:2018 01:07:05







  







 Reading Location: [facility] C013X Ortho Consult Reading Room







 Electronically signed by: ARNULFO BATISTA MD on 2018 01:07 AM







 









 Procedure Note

 

 Interface, External Ris In - 2018  1:09 AM CST



FINAL REPORT



 



 PATIENT ID:   [id number]



 



 CT, CHEST WITH IV CONTRAST- PE TEST DESIGN



 



 INDICATION: "dyspnea"



 



 COMPARISON: None



 



 TECHNIQUE: Contrast enhanced CT examination of the chest in the



 pulmonary arterial phase from the bases to the apices. Orthogonal



 reformatted images as well as coronal maximum intensity projection



 images were obtained.



 



 DOSE REDUCTION: Dose modulation, iterative reconstruction, and/or



 weight-based adjustment of the mA/kV was utilized to reduce the



 radiation dose to as low as reasonably achievable.



 



 FINDINGS:



 No pulmonary embolism.



 Cardiomegaly.



 No pathologic adenopathy in the chest per CT size criteria.



 



 Multiple tree-in-bud opacities in the lungs, worse in the inferior



 portion of the right upper lobe and left upper lobe. More



 consolidative changes present in the left upper lobe with air



 bronchograms. Findings are compatible with a multifocal



 bronchopneumonia.



 



 Visualized portion of the upper abdomen shows no acute abnormality.



 Hepatic steatosis.



 



 Multiple remote left-sided rib fractures.



 



 IMPRESSION:



 No pulmonary embolism.



 Multifocal bilateral bronchopneumonia.



 



 Signed: Arnulfo Batista MD



 Report Verified Date/Time:  2018 01:07:05



 



 Reading Location: [facility] B1 C013X Ortho Consult Reading Room



       Electronically signed by: ARNULFO BATISTA MD on 2018 01:07 AM



 



D-dimer (2018 11:56 PM)





 Component  Value  Ref Range

 

 D-Dimer, Quant  1.33 (H)  <0.50 MG/L FEU









 Specimen  Performing Laboratory

 

 [place] [address]









 Narrative

 

  







 Intended Use: The D-Dimer Assay can be used to aid in the diagnosis of Deep 
Vein



 Thrombosis (DVT) and Pulmonary Embolism Disease (PED).







 In patients with low pre-test probability, various studies concerning STA 
Liatest



 D-dimer test have reported that with a cutoff value of 0.50 MG/L FEU, the 
Negative



 Predictive Value (NPV) regarding the exclusion of thrombosis is within % 
range.



Legionella antigen, urine (2018 11:46 PM)





 Component  Value  Ref Range

 

 Legionella Urine Antigen  Negative - see commentComment: Negative for L.  



   pneumophila serogroup 1 antigen, suggesting no  



   recent or current infection with this serogroup.  



   Legionellosis cannot be ruled out since other  



   serogroups and species may cause disease.  









 Specimen  Performing Laboratory

 

 [place] [address]



Urinalysis w/ Microscopic (2018 11:46 PM)





 Component  Value  Ref Range

 

 Color, UA  Yellow  

 

 Clarity, UA  Hazy  

 

 Specific Gravity, UA  1.024  1.001 - 1.035

 

 pH, UA  6.0  5.0 - 8.0

 

 Protein, UA  50 mg/dL (A)  Negative

 

 Glucose, UA  Negative  Negative

 

 Ketones, UA  10 mg/dL (A)  Negative

 

 Bilirubin, UA  Negative  Negative

 

 Blood, UA  Negative  Negative

 

 Nitrite, UA  Negative  Negative

 

 Leukocytes, UA  Negative  Negative

 

 Urobilinogen, UA  8.0 (H)  0.2 - 1.0 mg/dL

 

 RBC, UA  0  /HPF

 

 WBC, UA  1  /HPF

 

 Mucus  Many  

 

 Amorphous Crystals  Rare  

 

 Specimen Source    









 Specimen  Performing Laboratory

 

 [place] [address]



Blood culture (2018 10:04 PM)Only the most recent of2 resultswithin the 
time period is included.





 Component  Value  Ref Range

 

 Result  No growth in 5 days  









 Specimen  Performing Laboratory

 

 Blood - Arm, Right  [place] [id number]



Influenza antigen A &amp; B (Rapid) (2018  6:43 PM)





 Component  Value  Ref Range

 

 Rapid Influenza A Antigen  Negative  Negative, Inconclusive

 

 Rapid influenza B Antigen  Negative  Negative, Inconclusive









 Specimen  Performing Laboratory

 

 Nasopharyngeal - [place]oph[place] [address]



after 2017

## 2018-07-07 NOTE — RAD REPORT
EXAM DESCRIPTION:  Yandel Single View7/7/2018 8:59 pm

 

CLINICAL HISTORY:  Chest pain

 

COMPARISON:  none

 

FINDINGS:  Artifact overlies the chest obscuring detail.

 

Mild bilateral pulmonary opacities are suspected. . The heart is mildly enlarged.

 

IMPRESSION:   Mild bilateral interstitial lung opacities may represent mild interstitial pulmonary ed
ema

## 2018-07-07 NOTE — EDPHYS
Physician Documentation                                                                           

 [facility]                                                                

Name: Gregory Cardozo                                                                                 

Age: 58 yrs                                                                                       

Sex: Male                                                                                         

: 1960                                                                                   

MRN: [id number]                                                                                   

Arrival Date: 2018                                                                          

Time: 20:38                                                                                       

Account#: [id number]                                                                            

Bed 3                                                                                             

Private MD:                                                                                       

ED Physician Singh Watson                                                                      

HPI:                                                                                              

                                                                                             

20:56 This 58 yrs old  Male presents to ER via [facility] with complaints of         chuyita 

      unresponsive.                                                                               

20:56 The patient presents with decreased mental status, decreased responsiveness. Onset: The chuyita 

      symptoms/episode began/occurred just prior to arrival. Possible causes: unknown.            

      Associated signs and symptoms: The patient has no apparent associated signs or              

      symptoms. Current symptoms: In the emergency department the patient's symptoms are          

      unchanged from the initial presentation, despite home interventions, despite EMS            

      interventions. Patient's baseline: Neuro: alert and fully oriented. The patient has not     

      experienced similar symptoms in the past.                                                   

                                                                                                  

Historical:                                                                                       

- Allergies:                                                                                      

21:02 No Known Allergies;                                                                     bb  

- Home Meds:                                                                                      

23:22 Plavix 75 mg Oral tab 1 tab once daily [Active];                                        aa1 

- PMHx:                                                                                           

23:22 Hypertension; Myocardial infarction;                                                    aa1 

- PSHx:                                                                                           

23:22 Heart stents;                                                                           aa1 

                                                                                                  

- Immunization history:: Adult Immunizations up to date.                                          

- Social history:: Smoking status: unknown.                                                       

- Family history:: not pertinent.                                                                 

- Ebola Screening: : No symptoms or risks identified at this time.                                

                                                                                                  

                                                                                                  

ROS:                                                                                              

20:56 Unable to obtain ROS due to patient distress, patient being uncooperative.              chuyita 

21:39 Eyes: Negative for injury, pain, redness, and discharge, ENT: Negative for injury,      chuyita 

      pain, and discharge, Neck: Negative for injury, pain, and swelling, Back: Negative for      

      injury and pain, : Negative for injury, bleeding, discharge, and swelling, Skin:          

      Negative for injury, rash, and discoloration.                                               

21:39 Constitutional: Negative for body aches, chills, fatigue, fever.                            

                                                                                                  

Exam:                                                                                             

20:56 Constitutional:  This is a well developed, well nourished patient who is awake, alert,  chuyita 

      and in no acute distress. Head/Face:  Normocephalic, atraumatic. Eyes:  Pupils equal        

      round and reactive to light, extra-ocular motions intact.  Lids and lashes normal.          

      Conjunctiva and sclera are non-icteric and not injected.  Cornea within normal limits.      

      Periorbital areas with no swelling, redness, or edema. ENT:  Nares patent. No nasal         

      discharge, no septal abnormalities noted.  Tympanic membranes are normal and external       

      auditory canals are clear.  Oropharynx with no redness, swelling, or masses, exudates,      

      or evidence of obstruction, uvula midline.  Mucous membranes moist. Neck:  Trachea          

      midline, no thyromegaly or masses palpated, and no cervical lymphadenopathy.  Supple,       

      full range of motion without nuchal rigidity, or vertebral point tenderness.  No            

      Meningismus. Chest/axilla:  Normal chest wall appearance and motion.  Nontender with no     

      deformity.  No lesions are appreciated. Cardiovascular:  Regular rate and rhythm with a     

      normal S1 and S2.  No gallops, murmurs, or rubs.  Normal PMI, no JVD.  No pulse             

      deficits. Respiratory:  Lungs have equal breath sounds bilaterally, clear to                

      auscultation and percussion.  No rales, rhonchi or wheezes noted.  No increased work of     

      breathing, no retractions or nasal flaring. Male :  Normal genitalia with no              

      discharge or lesions. Skin:  Warm, dry with normal turgor.  Normal color with no            

      rashes, no lesions, and no evidence of cellulitis. MS/ Extremity:  Pulses equal, no         

      cyanosis.  Neurovascular intact.  Full, normal range of motion. Psych:  Awake, alert,       

      with orientation to person, place and time.  Behavior, mood, and affect are within          

      normal limits.                                                                              

20:56 Abdomen/GI: Inspection: distension, Bowel sounds: normal, Palpation: nontender, Liver:      

      no appreciated palpable abnormalities, Hernia: not appreciated.                             

                                                                                                  

Vital Signs:                                                                                      

20:38  / 95; Pulse 94; Resp 30 S; Pulse Ox 93% on 15% Non-rebreather mask; Weight       bb  

      122.47 kg; Height 6 ft. 0 in. (182.88 cm) (R);                                              

20:43  / 69; Pulse 90; Resp 32; Temp 97.7(C); Pulse Ox 93% on 15% Non-rebreather mask;  bb  

21:07  / 78; Pulse 90; Resp 21; Temp 97.8(C); Pulse Ox 91% on 15% Non-rebreather mask;  bb  

21:40  / 79; Pulse 90; Resp 20; Temp 97.6(C); Pulse Ox 92% on Nebulizer Mask;           aa1 

22:07  / 84; Pulse 95; Resp 19; Temp 98.0; Pulse Ox 94% on Non-rebreather mask;         jd3 

22:30  / 80; Pulse 95; Resp 20; Temp 98.0(C); Pulse Ox 94% on Non-rebreather mask;      aa1 

20:38 Body Mass Index 36.62 (122.47 kg, 182.88 cm)                                            bb  

                                                                                                  

MDM:                                                                                              

20:54 Patient medically screened.                                                             [facility] 

20:59 Data reviewed: vital signs, nurses notes, lab test result(s), EKG, radiologic studies,  [facility] 

      CT scan, plain films.                                                                       

                                                                                                  

                                                                                             

20:50 Order name: Basic Metabolic Panel; Complete Time: 21:35                                 [facility] 

                                                                                             

20:50 Order name: CBC with Diff; Complete Time: 21:35                                         [facility] 

                                                                                             

20:50 Order name: Ckmb; Complete Time: 21:35                                                  [facility] 

                                                                                             

20:50 Order name: CPK; Complete Time: 21:35                                                   [facility] 

                                                                                             

20:50 Order name: LFT's; Complete Time: 21:35                                                 [facility] 

                                                                                             

20:50 Order name: Magnesium; Complete Time: 21:35                                             [facility] 

                                                                                             

20:50 Order name: NT PRO-BNP; Complete Time: 21:35                                            [facility] 

                                                                                             

20:50 Order name: PT-INR; Complete Time: 21:35                                                [facility] 

                                                                                             

20:50 Order name: Ptt, Activated; Complete Time: 21:35                                        [facility] 

                                                                                             

20:50 Order name: Troponin (emerg Dept Use Only); Complete Time: 21:35                        [facility] 

                                                                                             

20:50 Order name: XRAY Chest (1 view); Complete Time: 21:35                                   [facility] 

                                                                                             

20:55 Order name: CT Head Brain wo Cont; Complete Time: 22:16                                 [facility] 

                                                                                             

20:55 Order name: ABG; Complete Time: 21:03                                                   [facility] 

                                                                                             

21:12 Order name: Urine Dipstick--Ancillary (enter results); Complete Time: 21:53             [facility] 

                                                                                             

20:50 Order name: EKG; Complete Time: 20:50                                                   [facility] 

                                                                                             

20:50 Order name: Cardiac monitoring; Complete Time: 20:57                                    [facility] 

                                                                                             

20:50 Order name: EKG - Nurse/Tech; Complete Time: 20:57                                      [facility] 

                                                                                             

20:50 Order name: IV Saline Lock; Complete Time: 20:57                                        [facility] 

                                                                                             

20:50 Order name: Labs collected and sent; Complete Time: 20:57                               [facility] 

                                                                                             

20:50 Order name: O2 Per Protocol; Complete Time: 20:57                                       [facility] 

                                                                                             

21:52 Order name: BIPAP                                                                       [facility] 

                                                                                             

22:30 Order name: EKG; Complete Time: 22:30                                                   [facility] 

                                                                                             

20:50 Order name: O2 Sat Monitoring; Complete Time: 20:57                                     [facility] 

                                                                                             

20:50 Order name: Urine Dipstick-Ancillary (obtain specimen); Complete Time: 21:20            [facility] 

                                                                                             

20:55 Order name: Restrain Patient; Complete Time: 20:57                                      [facility] 

                                                                                             

22:30 Order name: EKG - Nurse/Tech; Complete Time: 23:09                                      [facility] 

                                                                                                  

Administered Medications:                                                                         

21:45 Drug: Heparin (MI-Bolus No thrombolytic) - HEParin 60 units/kg {Co-Signature: joyce        aa1 

      (Shana Bragg RN).} Route: IVP; Site: right antecubital;                                  

23:00 Follow up: Response: No adverse reaction; No change in condition                        aa1 

21:45 Drug: Heparin (MI Drip) 12 units/kg/hr - (HEParin 84738 units, D5W 500 ml)              aa 

      {Co-Signature: joyce (Shana Bragg RN).} Route: IV; Rate: calculated rate; Site: right       

      antecubital;                                                                                

23:05 Follow up: IV Status: Infusion continued upon transfer                                  aa1 

21:45 Drug: Lasix 60 mg Route: IVP; Site: right antecubital;                                  aa1 

23:00 Follow up: Response: No adverse reaction; No change in condition                        aa1 

21:50 Drug: ProTONIX 40 mg Route: IVP; Site: right antecubital;                               aa1 

23:00 Follow up: Response: No adverse reaction; No change in condition                        aa1 

22:35 Drug: PlaVIX 75 mg Route: PO;                                                           aa1 

23:00 Follow up: Response: No adverse reaction; No change in condition                        aa1 

22:35 Drug: Aspirin Chewable Tablet 324 mg Route: PO;                                         aa1 

23:00 Follow up: Response: No adverse reaction; No change in condition                        aa1 

23:00 Not Given (Other Intervention Used): Aspirin Suppository 300 mg MS once                 aa1 

23:05 Not Given (pt left facility prior to administration): foLIC Acid 1 mg IVPB once         aa1 

                                                                                                  

                                                                                                  

Point of Care Testing:                                                                            

      Blood Glucose:                                                                              

21:02 Blood Glucose: 151 mg/dL;                                                               bb  

      Ranges:                                                                                     

      Critical Glucose Levels:Adult <50 mg/dl or >400 mg/dl  <40 mg/dl or >180 mg/dl       

Disposition:                                                                                      

18 21:39 Transfer ordered to [facility]. Diagnosis are Altered          

  mental status, unspecified, Abnormal electrocardiogram [ECG] [EKG] - left                       

  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                       

  Obesity, unspecified, Unspecified diastolic (congestive) heart failure.                         

- Reason for transfer: Higher level of care.                                                      

- Accepting physician is to [facility], u, code stemi.                                                 

- Condition is Critical.                                                                          

- Problem is new.                                                                                 

- Symptoms have improved.                                                                         

                                                                                                  

                                                                                                  

                                                                                                  

Signatures:                                                                                       

Dispatcher [employer]                           EDMS                                                 

Jeanne Boyd RN                        RN   aa1                                                  

Singh Watson MD MD cha Ballard, Brenda, RN RN   bb                                                   

Brian Gibson RN RN   jd3                                                  

Shana Bragg RN                            bb                                                   

                                                                                                  

Corrections: (The following items were deleted from the chart)                                    

22:23 21:39 2018 21:39 Transfer ordered to [facility]. Diagnosis is chuyita 

      Altered mental status, unspecified; Abnormal electrocardiogram [ECG] [EKG] - left           

      bundle, code stemi. Reason for transfer: Higher level of care. Accepting physician is       

      to [facility], code stemi. Condition is Critical. Problem is new. Symptoms have improved.     

      chuyita                                                                                         

23:09 22:23 2018 21:39 Transfer ordered to [facility]. Diagnosis is aa1 

      Altered mental status, unspecified; Abnormal electrocardiogram [ECG] [EKG] - left           

      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          

      unspecified; Unspecified diastolic (congestive) heart failure. Reason for transfer:         

      Higher level of care. Accepting physician is to [facility], u, code stemi. Condition is          

      Critical. Problem is new. Symptoms have improved. chuyita                                       

                                                                                                  

**************************************************************************************************

## 2018-07-07 NOTE — XMS REPORT
Patient Summary Document

 Created on:2018



Patient:GRACE SOOD

Sex:Male

:1960

External Reference #:[id number]





Demographics







 Address  [address]



   [place] [address]

 

 Home Phone  [phone]

 

 Email Address  [email or web address]

 

 Preferred Language  Unknown

 

 Marital Status  Unknown

 

 Gnosticist Affiliation  Unknown

 

 Race  Unknown

 

 Ethnic Group  Unknown









Author







 Organization  [facility]

 

 Address  [address]



   [place] [address]

 

 Phone  [phone]









Care Team Providers







 Name  Role  Phone

 

 ARJUN WHITE  Unavailable  Unavailable









Problems

This patient has no known problems.



Allergies, Adverse Reactions, Alerts

This patient has no known allergies or adverse reactions.



Medications

This patient has no known medications.



Results







 Test Description  Test Time  Test Comments  Text Results  Atomic Results  
Result Comments









 MAGNESIUM  2018-06-15 05:33:00    









   Test Item  Value  Reference Range  Comments









 MAGNESIUM (BEAKER) (test code=627)  2.3 mg/dL  1.6-2.6  



BASIC METABOLIC PANEL2018-06-15 05:33:00





 Test Item  Value  Reference Range  Comments

 

 SODIUM (BEAKER) (test  139 meq/L  136-145  



 ixtc=360)      

 

 POTASSIUM (BEAKER) (test  3.8 meq/L  3.5-5.1  



 code=379)      

 

 CHLORIDE (BEAKER) (test  106 meq/L    



 code=382)      

 

 CO2 (BEAKER) (test  24 meq/L  22-29  



 code=355)      

 

 BLOOD UREA NITROGEN  28 mg/dL  7-21  



 (BEAKER) (test code=354)      

 

 CREATININE (BEAKER) (test  0.86 mg/dL  0.57-1.25  



 code=358)      

 

 GLUCOSE RANDOM (BEAKER)  123 mg/dL    



 (test code=652)      

 

 CALCIUM (BEAKER) (test  9.1 mg/dL  8.4-10.2  



 code=697)      

 

 EGFR (BEAKER) (test  91 mL/min/1.73 sq m    ESTIMATED GFR IS NOT AS



 code=1092)      ACCURATE AS CREATININE



       CLEARANCE IN PREDICTING



       GLOMERULAR FILTRATION



       RATE. ESTIMATED GFR IS



       NOT APPLICABLE FOR



       DIALYSIS PATIENTS.



CBC (HEMOGRAM ONLY)2018-06-15 05:08:00





 Test Item  Value  Reference Range  Comments

 

 WHITE BLOOD CELL COUNT (BEAKER) (test code=775)  7.8 K/ L  3.5-10.5  

 

 RED BLOOD CELL COUNT (BEAKER) (test code=761)  4.80 M/ L  4.63-6.08  

 

 HEMOGLOBIN (BEAKER) (test code=410)  14.0 GM/DL  13.7-17.5  

 

 HEMATOCRIT (BEAKER) (test code=411)  41.0 %  40.1-51.0  

 

 MEAN CORPUSCULAR VOLUME (BEAKER) (test code=753)  85.4 fL  79.0-92.2  

 

 MEAN CORPUSCULAR HEMOGLOBIN (BEAKER) (test  29.2 pg  25.7-32.2  



 rtra=060)      

 

 MEAN CORPUSCULAR HEMOGLOBIN CONC (BEAKER) (test  34.1 GM/DL  32.3-36.5  



 code=752)      

 

 RED CELL DISTRIBUTION WIDTH (BEAKER) (test  13.0 %  11.6-14.4  



 code=412)      

 

 PLATELET COUNT (BEAKER) (test code=756)  227 K/CU MM  150-450  

 

 MEAN PLATELET VOLUME (BEAKER) (test code=754)  10.1 fL  9.4-12.4  

 

 NUCLEATED RED BLOOD CELLS (BEAKER) (test  0 /100 WBC  0-0  



 code=413)      



[id number] 05:59:00





 Test Item  Value  Reference Range  Comments

 

 MAGNESIUM (BEAKER) (test code=627)  2.4 mg/dL  1.6-2.6  



BASIC METABOLIC [id number] 05:59:00





 Test Item  Value  Reference Range  Comments

 

 SODIUM (BEAKER) (test  141 meq/L  136-145  



 lrzo=138)      

 

 POTASSIUM (BEAKER) (test  3.8 meq/L  3.5-5.1  



 code=379)      

 

 CHLORIDE (BEAKER) (test  106 meq/L    



 code=382)      

 

 CO2 (BEAKER) (test  25 meq/L  22-29  



 code=355)      

 

 BLOOD UREA NITROGEN  26 mg/dL  7-21  



 (BEAKER) (test code=354)      

 

 CREATININE (BEAKER) (test  0.97 mg/dL  0.57-1.25  



 code=358)      

 

 GLUCOSE RANDOM (BEAKER)  135 mg/dL    



 (test code=652)      

 

 CALCIUM (BEAKER) (test  9.4 mg/dL  8.4-10.2  



 code=697)      

 

 EGFR (BEAKER) (test  79 mL/min/1.73 sq m    ESTIMATED GFR IS NOT AS



 code=1092)      ACCURATE AS CREATININE



       CLEARANCE IN PREDICTING



       GLOMERULAR FILTRATION



       RATE. ESTIMATED GFR IS



       NOT APPLICABLE FOR



       DIALYSIS PATIENTS.



CBC (HEMOGRAM ONLY)2018 05:39:00





 Test Item  Value  Reference Range  Comments

 

 WHITE BLOOD CELL COUNT (BEAKER) (test code=775)  8.1 K/ L  3.5-10.5  

 

 RED BLOOD CELL COUNT (BEAKER) (test code=761)  4.81 M/ L  4.63-6.08  

 

 HEMOGLOBIN (BEAKER) (test code=410)  14.0 GM/DL  13.7-17.5  

 

 HEMATOCRIT (BEAKER) (test code=411)  41.9 %  40.1-51.0  

 

 MEAN CORPUSCULAR VOLUME (BEAKER) (test code=753)  87.1 fL  79.0-92.2  

 

 MEAN CORPUSCULAR HEMOGLOBIN (BEAKER) (test  29.1 pg  25.7-32.2  



 jcpb=566)      

 

 MEAN CORPUSCULAR HEMOGLOBIN CONC (BEAKER) (test  33.4 GM/DL  32.3-36.5  



 code=752)      

 

 RED CELL DISTRIBUTION WIDTH (BEAKER) (test  13.3 %  11.6-14.4  



 code=412)      

 

 PLATELET COUNT (BEAKER) (test code=756)  216 K/CU MM  150-450  

 

 MEAN PLATELET VOLUME (BEAKER) (test code=754)  9.9 fL  9.4-12.4  

 

 NUCLEATED RED BLOOD CELLS (BEAKER) (test  0 /100 WBC  0-0  



 code=413)      



[id number] 06:27:00





 Test Item  Value  Reference Range  Comments

 

 MAGNESIUM (BEAKER) (test  2.3 mg/dL  1.6-2.6  Specimen slightly hemolyzed



 code=627)      



BASIC METABOLIC [id number] 06:27:00





 Test Item  Value  Reference Range  Comments

 

 SODIUM (BEAKER) (test  140 meq/L  136-145  



 iyso=664)      

 

 POTASSIUM (BEAKER) (test  3.6 meq/L  3.5-5.1  Specimen slightly



 code=379)      hemolyzed

 

 CHLORIDE (BEAKER) (test  106 meq/L    



 code=382)      

 

 CO2 (BEAKER) (test  25 meq/L  22-29  



 code=355)      

 

 BLOOD UREA NITROGEN  25 mg/dL  7-21  



 (BEAKER) (test code=354)      

 

 CREATININE (BEAKER) (test  1.17 mg/dL  0.57-1.25  Specimen slightly



 code=358)      hemolyzed

 

 GLUCOSE RANDOM (BEAKER)  136 mg/dL    



 (test code=652)      

 

 CALCIUM (BEAKER) (test  9.0 mg/dL  8.4-10.2  



 code=697)      

 

 EGFR (BEAKER) (test  64 mL/min/1.73 sq m    ESTIMATED GFR IS NOT AS



 code=1092)      ACCURATE AS CREATININE



       CLEARANCE IN PREDICTING



       GLOMERULAR FILTRATION



       RATE. ESTIMATED GFR IS



       NOT APPLICABLE FOR



       DIALYSIS PATIENTS.



CBC (HEMOGRAM ONLY)2018 04:51:00





 Test Item  Value  Reference Range  Comments

 

 WHITE BLOOD CELL COUNT (BEAKER) (test code=775)  8.9 K/ L  3.5-10.5  

 

 RED BLOOD CELL COUNT (BEAKER) (test code=761)  4.59 M/ L  4.63-6.08  

 

 HEMOGLOBIN (BEAKER) (test code=410)  13.4 GM/DL  13.7-17.5  

 

 HEMATOCRIT (BEAKER) (test code=411)  39.5 %  40.1-51.0  

 

 MEAN CORPUSCULAR VOLUME (BEAKER) (test code=753)  86.1 fL  79.0-92.2  

 

 MEAN CORPUSCULAR HEMOGLOBIN (BEAKER) (test  29.2 pg  25.7-32.2  



 fteb=204)      

 

 MEAN CORPUSCULAR HEMOGLOBIN CONC (BEAKER) (test  33.9 GM/DL  32.3-36.5  



 code=752)      

 

 RED CELL DISTRIBUTION WIDTH (BEAKER) (test  13.3 %  11.6-14.4  



 code=412)      

 

 PLATELET COUNT (BEAKER) (test code=756)  226 K/CU MM  150-450  

 

 MEAN PLATELET VOLUME (BEAKER) (test code=754)  10.3 fL  9.4-12.4  

 

 NUCLEATED RED BLOOD CELLS (BEAKER) (test  0 /100 WBC  0-0  



 code=413)      



POCT-GLUCOSE [id number] 22:08:00





 Test Item  Value  Reference Range  Comments

 

 POC-GLUCOSE METER (BEAKER)  91 mg/dL    TESTED AT [facility] [address]



 (test jnar=7975)      [facility] [address]



[id number] 15:25:00





 Test Item  Value  Reference Range  Comments

 

 ACTIVATED CLOTTING TIME  136 sec    TESTED AT [address]



 ([facility]) (test code=441)      [place] [address]



[id number] 14:24:00





 Test Item  Value  Reference Range  Comments

 

 ACTIVATED CLOTTING TIME  169 sec    TESTED AT [address]



 ([facility]) (test code=441)      [place] [address]



[id number] 11:29:00





 Test Item  Value  Reference Range  Comments

 

 ACTIVATED CLOTTING TIME  252 sec    TESTED AT [address]



 ([facility]) (test code=441)      [place] [address]



[id number] 11:29:00





 Test Item  Value  Reference Range  Comments

 

 ACTIVATED CLOTTING TIME  252 sec    TESTED AT [address]



 ([facility]) (test code=441)      [place] [address]



[id number] 10:22:00





 Test Item  Value  Reference Range  Comments

 

 ACTIVATED CLOTTING TIME  290 sec    TESTED AT [address]



 ([facility]) (test code=441)      [place] [address]



TROPONIN -30-44 06:20:00





 Test Item  Value  Reference Range  Comments

 

 TROPONIN I ([facility]) (test code=397)  1.57 ng/mL  0.00-0.03  








 Test Item  Value  Reference Range  Comments

 

 [facility]  14.0 seconds  11.7-14.7  

 

 INR ([facility]) (test code=370)  1.1  <=5.9  

 

 PARTIAL THROMBOPLASTIN TIME (BEAKER) (test  49.4 seconds  22.5-36.0  



 code=760)      



RECOMMENDED COUMADIN/WARFARIN INR THERAPY RANGESSTANDARD DOSE: 2.0 - 3.0   
Includes: PROPHYLAXIS forvenous thrombosis, systemic embolization; TREATMENT 
for venous thrombosis and/or pulmonary embolus.HIGH RISK: Target INR is 2.5-3.5 
for patients with mechanical heart valves.[id number] 05:45:00





 Test Item  Value  Reference Range  Comments

 

 MAGNESIUM (BEAKER) (test code=627)  2.3 mg/dL  1.6-2.6  



BASIC METABOLIC [id number] 05:45:00





 Test Item  Value  Reference Range  Comments

 

 SODIUM (BEAKER) (test  139 meq/L  136-145  



 rvvi=892)      

 

 POTASSIUM (BEAKER) (test  3.8 meq/L  3.5-5.1  



 code=379)      

 

 CHLORIDE (BEAKER) (test  104 meq/L    



 code=382)      

 

 CO2 (BEAKER) (test  25 meq/L  22-29  



 code=355)      

 

 BLOOD UREA NITROGEN  24 mg/dL  7-21  



 (BEAKER) (test code=354)      

 

 CREATININE (BEAKER) (test  0.81 mg/dL  0.57-1.25  



 code=358)      

 

 GLUCOSE RANDOM (BEAKER)  107 mg/dL    



 (test code=652)      

 

 CALCIUM (BEAKER) (test  9.5 mg/dL  8.4-10.2  



 code=697)      

 

 EGFR (BEAKER) (test  98 mL/min/1.73 sq m    ESTIMATED GFR IS NOT AS



 code=1092)      ACCURATE AS CREATININE



       CLEARANCE IN PREDICTING



       GLOMERULAR FILTRATION



       RATE. ESTIMATED GFR IS



       NOT APPLICABLE FOR



       DIALYSIS PATIENTS.



CBC (HEMOGRAM ONLY)2018 05:35:00





 Test Item  Value  Reference Range  Comments

 

 WHITE BLOOD CELL COUNT (BEAKER) (test code=775)  7.3 K/ L  3.5-10.5  

 

 RED BLOOD CELL COUNT (BEAKER) (test code=761)  5.19 M/ L  4.63-6.08  

 

 HEMOGLOBIN (BEAKER) (test code=410)  15.3 GM/DL  13.7-17.5  

 

 HEMATOCRIT (BEAKER) (test code=411)  43.8 %  40.1-51.0  

 

 MEAN CORPUSCULAR VOLUME (BEAKER) (test code=753)  84.4 fL  79.0-92.2  

 

 MEAN CORPUSCULAR HEMOGLOBIN (BEAKER) (test  29.5 pg  25.7-32.2  



 hoep=874)      

 

 MEAN CORPUSCULAR HEMOGLOBIN CONC (BEAKER) (test  34.9 GM/DL  32.3-36.5  



 code=752)      

 

 RED CELL DISTRIBUTION WIDTH (BEAKER) (test  13.1 %  11.6-14.4  



 code=412)      

 

 PLATELET COUNT (BEAKER) (test code=756)  232 K/CU MM  150-450  

 

 MEAN PLATELET VOLUME (BEAKER) (test code=754)  10.0 fL  9.4-12.4  

 

 NUCLEATED RED BLOOD CELLS (BEAKER) (test  0 /100 WBC  0-0  



 code=413)      



TROPONIN -64-98 23:20:00





 Test Item  Value  Reference Range  Comments

 

 TROPONIN I (BEAKER) (test code=397)  1.69 ng/mL  0.00-0.03  








 Test Item  Value  Reference Range  Comments

 

 PROTIME (BEAKER) (test code=759)  14.2 seconds  11.7-14.7  

 

 INR (BEAKER) (test code=370)  1.1  <=5.9  

 

 PARTIAL THROMBOPLASTIN TIME (BEAKER) (test  44.2 seconds  22.5-36.0  



 code=760)      



RECOMMENDED COUMADIN/WARFARIN INR THERAPY RANGESSTANDARD DOSE: 2.0 - 3.0   
Includes: PROPHYLAXIS forvenous thrombosis, systemic embolization; TREATMENT 
for venous thrombosis and/or pulmonary embolus.HIGH RISK: Target INR is 2.5-3.5 
for patients with mechanical heart valves.TROPONIN -69-79 15:59:00





 Test Item  Value  Reference Range  Comments

 

 TROPONIN I (BEAKER) (test code=397)  0.62 ng/mL  0.00-0.03  



Troponin I (TnI) levels must be interpreted in the context of the presenting 
symptoms and the clinical findings. Elevated TnI levels indicate myocardial 
damage, but are not specific for ischemic heart disease. Elevated TnI levels 
are seen in patients with other cardiac conditions (including myocarditis and 
congestive heart failure), and slight TnI elevations occur in patients with 
other conditions, including sepsis, renal failure, acidosis, acute neurological 
disease, and persistent tachyarrhythmia.B-TYPE NATRIURETIC FACTOR (BNP) 15:55:00





 Test Item  Value  Reference Range  Comments

 

 B-TYPE NATRIURETIC PEPTIDE (BEAKER) (test  148 pg/mL  0-100  



 code=700)      



RAD, CHEST, 1 VIEW, NON [id number] 15:49:00Reason for exam:-&gt;CHEST 
PAINFINAL REPORT PATIENT ID:   [id number] Chest, 1 view, 2018 3:41 PM.  
History: Chest pain. Comparison: 2018. Discussion:  The cardiomediastinal 
silhouette and pulmonary vasculature are within normal limits for a portable 
exam. The lungs are clear without evidence of consolidation or effusion.  
Multiple old left rib fractures are again noted.    IMPRESSION: No acute 
cardiopulmonary abnormality. Signed: Arjun Monet MDReport Verified Date/Time
:  2018 15:49:11 Reading Location: [facility] Reading Room     
Electronically signed by: ARJUN MONET M.D. on 2018 03:49 
[id number] 15:47:00





 Test Item  Value  Reference Range  Comments

 

 MAGNESIUM (BEAKER) (test code=627)  2.4 mg/dL  1.6-2.6  



BASIC METABOLIC [id number] 15:47:00





 Test Item  Value  Reference Range  Comments

 

 SODIUM (BEAKER) (test  139 meq/L  136-145  



 euga=391)      

 

 POTASSIUM (BEAKER) (test  4.1 meq/L  3.5-5.1  



 code=379)      

 

 CHLORIDE (BEAKER) (test  105 meq/L    



 code=382)      

 

 CO2 (BEAKER) (test  25 meq/L  22-29  



 code=355)      

 

 BLOOD UREA NITROGEN  21 mg/dL  7-21  



 (BEAKER) (test code=354)      

 

 CREATININE (BEAKER) (test  0.95 mg/dL  0.57-1.25  



 code=358)      

 

 GLUCOSE RANDOM (BEAKER)  104 mg/dL    



 (test code=652)      

 

 CALCIUM (BEAKER) (test  9.4 mg/dL  8.4-10.2  



 code=697)      

 

 EGFR (BEAKER) (test  81 mL/min/1.73 sq m    ESTIMATED GFR IS NOT AS



 code=1092)      ACCURATE AS CREATININE



       CLEARANCE IN PREDICTING



       GLOMERULAR FILTRATION



       RATE. ESTIMATED GFR IS



       NOT APPLICABLE FOR



       DIALYSIS PATIENTS.



[id number] 15:37:00





 Test Item  Value  Reference Range  Comments

 

 PROTIME (BEAKER) (test code=759)  13.4 seconds  11.7-14.7  

 

 INR (BEAKER) (test code=370)  1.0  <=5.9  

 

 PARTIAL THROMBOPLASTIN TIME (BEAKER) (test  31.9 seconds  22.5-36.0  



 code=760)      



RECOMMENDED COUMADIN/WARFARIN INR THERAPY RANGESSTANDARD DOSE: 2.0 - 3.0   
Includes: PROPHYLAXIS forvenous thrombosis, systemic embolization; TREATMENT 
for venous thrombosis and/or pulmonary embolus.HIGH RISK: Target INR is 2.5-3.5 
for patients with mechanical heart valves.CBC W/PLT COUNT &amp; AUTO 
[id number] 15:28:00





 Test Item  Value  Reference Range  Comments

 

 WHITE BLOOD CELL COUNT (BEAKER) (test code=775)  7.5 K/ L  3.5-10.5  

 

 RED BLOOD CELL COUNT (BEAKER) (test code=761)  5.54 M/ L  4.63-6.08  

 

 HEMOGLOBIN (BEAKER) (test code=410)  16.0 GM/DL  13.7-17.5  

 

 HEMATOCRIT (BEAKER) (test code=411)  47.4 %  40.1-51.0  

 

 MEAN CORPUSCULAR VOLUME (BEAKER) (test code=753)  85.6 fL  79.0-92.2  

 

 MEAN CORPUSCULAR HEMOGLOBIN (BEAKER) (test  28.9 pg  25.7-32.2  



 hlrp=376)      

 

 MEAN CORPUSCULAR HEMOGLOBIN CONC (BEAKER) (test  33.8 GM/DL  32.3-36.5  



 code=752)      

 

 RED CELL DISTRIBUTION WIDTH (BEAKER) (test  13.1 %  11.6-14.4  



 code=412)      

 

 PLATELET COUNT (BEAKER) (test code=756)  258 K/CU MM  150-450  

 

 MEAN PLATELET VOLUME (BEAKER) (test code=754)  9.6 fL  9.4-12.4  

 

 NUCLEATED RED BLOOD CELLS (BEAKER) (test  0 /100 WBC  0-0  



 code=413)      

 

 NEUTROPHILS RELATIVE PERCENT (BEAKER) (test  57 %    



 code=429)      

 

 LYMPHOCYTES RELATIVE PERCENT (BEAKER) (test  31 %    



 code=430)      

 

 MONOCYTES RELATIVE PERCENT (BEAKER) (test  9 %    



 code=431)      

 

 EOSINOPHILS RELATIVE PERCENT (BEAKER) (test  2 %    



 code=432)      

 

 BASOPHILS RELATIVE PERCENT (BEAKER) (test  1 %    



 code=437)      

 

 NEUTROPHILS ABSOLUTE COUNT (BEAKER) (test  4.30 K/ L  1.78-5.38  



 code=670)      

 

 LYMPHOCYTES ABSOLUTE COUNT (BEAKER) (test  2.36 K/ L  1.32-3.57  



 code=414)      

 

 MONOCYTES ABSOLUTE COUNT (BEAKER) (test  0.69 K/ L  0.30-0.82  



 code=415)      

 

 EOSINOPHILS ABSOLUTE COUNT (BEAKER) (test  0.12 K/ L  0.04-0.54  



 code=416)      

 

 BASOPHILS ABSOLUTE COUNT (BEAKER) (test  0.05 K/ L  0.01-0.08  



 code=417)      

 

 IMMATURE GRANULOCYTES-RELATIVE PERCENT (BEAKER)  0 %  0-1  



 (test code=2801)      



BLOOD [id number] 05:01:00





 Test Item  Value  Reference Range  Comments

 

 CULTURE (BEAKER) (test code=1095)  No growth in 5 days    



BLOOD [id number] 05:01:00





 Test Item  Value  Reference Range  Comments

 

 CULTURE (BEAKER) (test code=1095)  No growth in 5 days    



SPUTUM CULTURE + GRAM [id number] 21:30:00





 Test Item  Value  Reference Range  Comments

 

 CULTURE (BEAKER) (test  Oropharyngeal contamination,    



 code=1095)  specimen rejected. Recollect    



   requested.    

 

 GRAM STAIN RESULT (BEAKER)  1+ WBCs    



 (test code=1123)      

 

 GRAM STAIN RESULT (BEAKER)  2+ gram variable coccobacilli    



 (test code=11238)      



HEPATIC FUNCTION [id number] 05:56:00





 Test Item  Value  Reference Range  Comments

 

 TOTAL PROTEIN (BEAKER) (test code=770)  6.6 gm/dL  6.0-8.3  

 

 ALBUMIN (BEAKER) (test code=1145)  3.6 g/dL  3.5-5.0  

 

 BILIRUBIN TOTAL (BEAKER) (test code=377)  0.6 mg/dL  0.2-1.2  

 

 BILIRUBIN DIRECT (BEAKER) (test code=706)  0.2 mg/dL  0.1-0.5  

 

 ALKALINE PHOSPHATASE (BEAKER) (test code=346)  43 U/L    

 

 AST (SGOT) (BEAKER) (test code=353)  51 U/L  5-34  

 

 ALT (SGPT) (BEAKER) (test code=347)  62 U/L  6-55  



BASIC METABOLIC [id number] 05:56:00





 Test Item  Value  Reference Range  Comments

 

 SODIUM (BEAKER) (test  139 meq/L  136-145  



 lfrb=857)      

 

 POTASSIUM (BEAKER) (test  3.4 meq/L  3.5-5.1  



 code=379)      

 

 CHLORIDE (BEAKER) (test  105 meq/L    



 code=382)      

 

 CO2 (BEAKER) (test  24 meq/L  22-29  



 code=355)      

 

 BLOOD UREA NITROGEN  25 mg/dL  7-21  



 (BEAKER) (test code=354)      

 

 CREATININE (BEAKER) (test  0.78 mg/dL  0.57-1.25  



 code=358)      

 

 GLUCOSE RANDOM (BEAKER)  122 mg/dL    



 (test code=652)      

 

 CALCIUM (BEAKER) (test  8.5 mg/dL  8.4-10.2  



 code=697)      

 

 EGFR (BEAKER) (test  103 mL/min/1.73 sq m    ESTIMATED GFR IS NOT AS



 code=1092)      ACCURATE AS CREATININE



       CLEARANCE IN PREDICTING



       GLOMERULAR FILTRATION



       RATE. ESTIMATED GFR IS



       NOT APPLICABLE FOR



       DIALYSIS PATIENTS.



CBC W/PLT COUNT &amp; AUTO [id number] 05:04:00





 Test Item  Value  Reference Range  Comments

 

 WHITE BLOOD CELL COUNT (BEAKER) (test code=775)  7.1 K/ L  3.5-10.5  

 

 RED BLOOD CELL COUNT (BEAKER) (test code=761)  5.00 M/ L  4.63-6.08  

 

 HEMOGLOBIN (BEAKER) (test code=410)  14.3 GM/DL  13.7-17.5  

 

 HEMATOCRIT (BEAKER) (test code=411)  42.0 %  40.1-51.0  

 

 MEAN CORPUSCULAR VOLUME (BEAKER) (test code=753)  84.0 fL  79.0-92.2  

 

 MEAN CORPUSCULAR HEMOGLOBIN (BEAKER) (test  28.6 pg  25.7-32.2  



 nwrp=872)      

 

 MEAN CORPUSCULAR HEMOGLOBIN CONC (BEAKER) (test  34.0 GM/DL  32.3-36.5  



 code=752)      

 

 RED CELL DISTRIBUTION WIDTH (BEAKER) (test  12.9 %  11.6-14.4  



 code=412)      

 

 PLATELET COUNT (BEAKER) (test code=756)  210 K/CU MM  150-450  

 

 MEAN PLATELET VOLUME (BEAKER) (test code=754)  10.3 fL  9.4-12.4  

 

 NUCLEATED RED BLOOD CELLS (BEAKER) (test  0 /100 WBC  0-0  



 code=413)      

 

 NEUTROPHILS RELATIVE PERCENT (BEAKER) (test  58 %    



 code=429)      

 

 LYMPHOCYTES RELATIVE PERCENT (BEAKER) (test  34 %    



 code=430)      

 

 MONOCYTES RELATIVE PERCENT (BEAKER) (test  7 %    



 code=431)      

 

 EOSINOPHILS RELATIVE PERCENT (BEAKER) (test  1 %    



 code=432)      

 

 BASOPHILS RELATIVE PERCENT (BEAKER) (test  0 %    



 code=437)      

 

 NEUTROPHILS ABSOLUTE COUNT (BEAKER) (test  4.11 K/ L  1.78-5.38  



 code=670)      

 

 LYMPHOCYTES ABSOLUTE COUNT (BEAKER) (test  2.44 K/ L  1.32-3.57  



 code=414)      

 

 MONOCYTES ABSOLUTE COUNT (BEAKER) (test  0.51 K/ L  0.30-0.82  



 code=415)      

 

 EOSINOPHILS ABSOLUTE COUNT (BEAKER) (test  0.04 K/ L  0.04-0.54  



 code=416)      

 

 BASOPHILS ABSOLUTE COUNT (BEAKER) (test  0.02 K/ L  0.01-0.08  



 code=417)      

 

 IMMATURE GRANULOCYTES-RELATIVE PERCENT (BEAKER)  0 %  0-1  



 (test code=2801)      



RESPIRATORY PANEL [id number] 11:46:00





 Test Item  Value  Reference Range  Comments

 

 HUMAN METAPNEUMOVIRUS (BEAKER) (test  Detected  Not detected, Inconclusive  



 code=2683)      

 

 RHINOVIRUS (BEAKER) (test code=2684)  Not detected  Not detected, Inconclusive
  

 

 INFLUENZA A (BEAKER) (test  Not detected  Not detected, Inconclusive  



 code=2685)      

 

 INFLUENZA A SUBTYPE H1 (BEAKER)  Not detected  Not detected, Inconclusive  



 (test code=2686)      

 

 INFLUENZA A SUBTYPE H3 (BEAKER)  Not detected  Not detected, Inconclusive  



 (test code=2687)      

 

 INFLUENZA A SUBTYPE H1-2009 (BEAKER)  Not detected  Not detected, Inconclusive
  



 (test code=3198)      

 

 INFLUENZA B (BEAKER) (test  Not detected  Not detected, Inconclusive  



 code=2688)      

 

 RESPIRATORY SYNCYTIAL VIRUS (BEAKER)  Not detected  Not detected, Inconclusive
  



 (test code=3199)      

 

 PARAINFLUENZA VIRUS 1 (BEAKER) (test  Not detected  Not detected, Inconclusive
  



 code=2691)      

 

 PARAINFLUENZA VIRUS 2 (BEAKER) (test  Not detected  Not detected, Inconclusive
  



 code=2692)      

 

 PARAINFLUENZA VIRUS 3 (BEAKER) (test  Not detected  Not detected, Inconclusive
  



 code=2693)      

 

 PARAINFLUENZA VIRUS 4 (BEAKER) (test  Not detected  Not detected, Inconclusive
  



 code=3200)      

 

 ADENOVIRUS (BEAKER) (test code=2694)  Not detected  Not detected, Inconclusive
  

 

 CORONAVIRUS 229E (BEAKER) (test  Not detected  Not detected, Inconclusive  



 code=3201)      

 

 CORONAVIRUS HKU1 (BEAKER) (test  Not detected  Not detected, Inconclusive  



 code=3202)      

 

 CORONAVIRUS NL63 (BEAKER) (test  Not detected  Not detected, Inconclusive  



 code=3203)      

 

 CORONAVIRUS OC43 (BEAKER) (test  Not detected  Not detected, Inconclusive  



 code=3204)      

 

 BORDETELLA PERTUSSIS (BEAKER) (test  Not detected  Not detected, Inconclusive  



 code=3205)      

 

 CHLAMYDOPHILA PNEUMONIAE (BEAKER)  Not detected  Not detected, Inconclusive  



 (test code=3206)      

 

 MYCOPLASMA PNEUMONIAE (BEAKER) (test  Not detected  Not detected, Inconclusive
  



 code=3207)      



CBC W/PLT COUNT &amp; AUTO [id number] 06:59:00





 Test Item  Value  Reference Range  Comments

 

 WHITE BLOOD CELL COUNT (BEAKER) (test code=775)  3.8 K/ L  3.5-10.5  

 

 RED BLOOD CELL COUNT (BEAKER) (test code=761)  5.27 M/ L  4.63-6.08  

 

 HEMOGLOBIN (BEAKER) (test code=410)  15.0 GM/DL  13.7-17.5  

 

 HEMATOCRIT (BEAKER) (test code=411)  43.3 %  40.1-51.0  

 

 MEAN CORPUSCULAR VOLUME (BEAKER) (test code=753)  82.2 fL  79.0-92.2  

 

 MEAN CORPUSCULAR HEMOGLOBIN (BEAKER) (test  28.5 pg  25.7-32.2  



 oqsm=640)      

 

 MEAN CORPUSCULAR HEMOGLOBIN CONC (BEAKER) (test  34.6 GM/DL  32.3-36.5  



 code=752)      

 

 RED CELL DISTRIBUTION WIDTH (BEAKER) (test  12.7 %  11.6-14.4  



 code=412)      

 

 PLATELET COUNT (BEAKER) (test code=756)  220 K/CU MM  150-450  

 

 MEAN PLATELET VOLUME (BEAKER) (test code=754)  10.0 fL  9.4-12.4  

 

 NUCLEATED RED BLOOD CELLS (BEAKER) (test  0 /100 WBC  0-0  



 code=413)      

 

 NEUTROPHILS RELATIVE PERCENT (BEAKER) (test  77 %    



 code=429)      

 

 LYMPHOCYTES RELATIVE PERCENT (BEAKER) (test  18 %    



 code=430)      

 

 MONOCYTES RELATIVE PERCENT (BEAKER) (test  4 %    



 code=431)      

 

 EOSINOPHILS RELATIVE PERCENT (BEAKER) (test  0 %    



 code=432)      

 

 BASOPHILS RELATIVE PERCENT (BEAKER) (test  0 %    



 code=437)      

 

 NEUTROPHILS ABSOLUTE COUNT (BEAKER) (test  2.93 K/ L  1.78-5.38  



 code=670)      

 

 LYMPHOCYTES ABSOLUTE COUNT (BEAKER) (test  0.70 K/ L  1.32-3.57  



 code=414)      

 

 MONOCYTES ABSOLUTE COUNT (BEAKER) (test  0.15 K/ L  0.30-0.82  



 code=415)      

 

 EOSINOPHILS ABSOLUTE COUNT (BEAKER) (test  0.00 K/ L  0.04-0.54  



 code=416)      

 

 BASOPHILS ABSOLUTE COUNT (BEAKER) (test  0.01 K/ L  0.01-0.08  



 code=417)      

 

 IMMATURE GRANULOCYTES-RELATIVE PERCENT (BEAKER)  0 %  0-1  



 (test code=2801)      



CREATINE KINASE (CK), TOTAL AND [id number] 06:47:00





 Test Item  Value  Reference Range  Comments

 

 CREATINE KINASE TOTAL (BEAKER) (test code=380)  428 U/L    

 

 CREATINE KINASE-MB (BEAKER) (test code=750)  1.0 ng/mL  0.0-6.6  

 

 CREATINE KINASE-MB INDEX (BEAKER) (test code=395)  0.2 %    



CK-MB Reference Range:&lt;6.7      Normal6.7-10.0  Borderline&gt;10.0     
AbnormalTROPONIN -81-73 06:47:00





 Test Item  Value  Reference Range  Comments

 

 TROPONIN I (BEAKER) (test code=397)  0.01 ng/mL  0.00-0.03  








 Test Item  Value  Reference Range  Comments

 

 MAGNESIUM (BEAKER) (test code=627)  2.2 mg/dL  1.6-2.6  



BASIC METABOLIC [id number] 06:35:00





 Test Item  Value  Reference Range  Comments

 

 SODIUM (BEAKER) (test  134 meq/L  136-145  



 oenm=015)      

 

 POTASSIUM (BEAKER) (test  3.8 meq/L  3.5-5.1  



 code=379)      

 

 CHLORIDE (BEAKER) (test  101 meq/L    



 code=382)      

 

 CO2 (BEAKER) (test  23 meq/L  22-29  



 code=355)      

 

 BLOOD UREA NITROGEN  17 mg/dL  7-21  



 (BEAKER) (test code=354)      

 

 CREATININE (BEAKER) (test  0.83 mg/dL  0.57-1.25  



 code=358)      

 

 GLUCOSE RANDOM (BEAKER)  187 mg/dL    



 (test code=652)      

 

 CALCIUM (BEAKER) (test  8.5 mg/dL  8.4-10.2  



 code=697)      

 

 EGFR (BEAKER) (test  95 mL/min/1.73 sq m    ESTIMATED GFR IS NOT AS



 code=1092)      ACCURATE AS CREATININE



       CLEARANCE IN PREDICTING



       GLOMERULAR FILTRATION



       RATE. ESTIMATED GFR IS



       NOT APPLICABLE FOR



       DIALYSIS PATIENTS.



LEGIONELLA ANTIGEN, [id number] 06:26:00





 Test Item  Value  Reference Range  Comments

 

 L. PNEUMOPHILA SEROGP 1  Negative - see    Negative for L.



 UR AG (BEAKER) (test  comment    pneumophila serogroup 1



 code=1156)      antigen, suggesting no



       recent or current



       infection with this



       serogroup. Legionellosis



       cannot be ruled out since



       other serogroups and



       species may cause



       disease.



URINALYSIS W/ [id number] 02:13:00





 Test Item  Value  Reference Range  Comments

 

 COLOR (BEAKER) (test code=470)  Yellow    

 

 CLARITY (BEAKER) (test code=469)  Hazy    

 

 SPECIFIC GRAVITY UA (BEAKER) (test code=468)  1.024  1.001-1.035  

 

 PH UA (BEAKER) (test code=467)  6.0  5.0-8.0  

 

 PROTEIN UA (BEAKER) (test code=464)  50 mg/dL  Negative  

 

 GLUCOSE UA (BEAKER) (test code=365)  Negative  Negative  

 

 KETONES UA (BEAKER) (test code=371)  10 mg/dL  Negative  

 

 BILIRUBIN UA (BEAKER) (test code=462)  Negative  Negative  

 

 BLOOD UA (BEAKER) (test code=461)  Negative  Negative  

 

 NITRITE UA (BEAKER) (test code=465)  Negative  Negative  

 

 LEUKOCYTE ESTERASE UA (BEAKER) (test code=466)  Negative  Negative  

 

 UROBILINOGEN UA (BEAKER) (test code=463)  8.0 mg/dL  0.2-1.0  

 

 RBC UA (BEAKER) (test code=519)  0 /HPF    

 

 WBC UA (BEAKER) (test code=520)  1 /HPF    

 

 MUCUS (BEAKER) (test code=1574)  Many    

 

 AMORPHOUS CRYSTALS (BEAKER) (test code=1584)  Rare    

 

 SOURCE(BEAKER) (test code=2795)      



CT, CHEST WITH IV CONTRAST- PE TEST [id number] 01:07:00FINAL REPORT 
PATIENT ID:   [id number] CT, CHEST WITH IV CONTRAST- PE TEST DESIGN INDICATION: 
"dyspnea"COMPARISON: None TECHNIQUE: Contrast enhanced CT examination of the 
chest in the pulmonary arterial phase from the bases to the apices. Orthogonal 
reformatted images as well as coronal maximum intensity projection images were 
obtained.  DOSE REDUCTION: Dose modulation, iterative reconstruction, and/
orweight-based adjustment of the mA/kV was utilized to reduce the radiation 
dose to as low as reasonably achievable. FINDINGS: No pulmonary 
embolism.Cardiomegaly.No pathologic adenopathy in the chest perCT size 
criteria.   Multiple tree-in-bud opacities in the lungs, worse in the inferior 
portion of the right upper lobe and left upper lobe. More consolidative changes 
present in the left upper lobe with air bronchograms. Findings are compatible 
with a multifocal bronchopneumonia.   Visualized portion of the upper abdomen 
shows no acute abnormality.Hepatic steatosis. Multiple remote left-sided rib 
fractures. IMPRESSION:No pulmonary embolism.Multifocal bilateral 
bronchopneumonia. Signed: Arnulfo Batista MDReport Verified Date/Time:  2018 01:07:05 Reading Location: [address] Ortho Consult Reading Room      
Electronically signed by: ARNULFO BATISTA MD on 2018 01:07 AMCREATINE 
KINASE (CK), TOTAL AND [id number] 00:55:00





 Test Item  Value  Reference Range  Comments

 

 CREATINE KINASE TOTAL (BEAKER) (test code=380)  463 U/L    

 

 CREATINE KINASE-MB (BEAKER) (test code=750)  0.9 ng/mL  0.0-6.6  

 

 CREATINE KINASE-MB INDEX (BEAKER) (test code=395)  0.2 %    



CK-MB Reference Range:&lt;6.7      Normal6.7-10.0  Borderline&gt;10.0     
AbnormalTROPONIN -71-84 00:55:00





 Test Item  Value  Reference Range  Comments

 

 TROPONIN I (BEAKER) (test code=397)  0.01 ng/mL  0.00-0.03  








 Test Item  Value  Reference Range  Comments

 

 D-DIMER QUANTITATIVE (BEAKER) (test code=671)  1.33 MG/L FEU  <0.50  



Intended Use: The D-Dimer Assay can be used to aid in the diagnosis of Deep 
Vein Thrombosis (DVT) and Pulmonary Embolism Disease (PED).In patients with low 
pre-test probability, various studies concerning STA Liatest D-dimer test have 
reported that with a cutoff value of 0.50 MG/L FEU, the Negative Predictive 
Value (NPV) regarding the exclusion of thrombosis is within % range.RAPID 
INFLUENZA A&amp;B [id number] 19:17:00





 Test Item  Value  Reference Range  Comments

 

 RAPID INFLUENZA A AG (BEAKER) (test  Negative  Negative, Inconclusive  



 code=1622)      

 

 RAPID INFLUENZA B AG (BEAKER) (test  Negative  Negative, Inconclusive  



 code=1623)      



CREATINE KINASE (CK), TOTAL AND [id number] 19:12:00





 Test Item  Value  Reference Range  Comments

 

 CREATINE KINASE TOTAL (BEAKER) (test code=380)  458 U/L    

 

 CREATINE KINASE-MB (BEAKER) (test code=750)  1.1 ng/mL  0.0-6.6  

 

 CREATINE KINASE-MB INDEX (BEAKER) (test code=395)  0.2 %    



CK-MB Reference Range:&lt;6.7      Normal6.7-10.0  Borderline&gt;10.0     
AbnormalTROPONIN -27-04 19:12:00





 Test Item  Value  Reference Range  Comments

 

 TROPONIN I (BEAKER) (test code=397)  0.02 ng/mL  0.00-0.03  



Troponin I (TnI) levels must be interpreted in the context of the presenting 
symptoms and the clinical findings. Elevated TnI levels indicate myocardial 
damage, but are not specific for ischemic heart disease. Elevated TnI levels 
are seen in patients with other cardiac conditions (including myocarditis and 
congestive heart failure), and slight TnI elevations occur in patients with 
other conditions, including sepsis, renal failure, acidosis, acute neurological 
disease, and persistent tachyarrhythmia.B-TYPE NATRIURETIC FACTOR (BNP) 19:11:00





 Test Item  Value  Reference Range  Comments

 

 B-TYPE NATRIURETIC PEPTIDE (BEAKER) (test  315 pg/mL  0-100  



 code=700)      



BASIC METABOLIC [id number] 19:06:00





 Test Item  Value  Reference Range  Comments

 

 SODIUM (BEAKER) (test  135 meq/L  136-145  



 mpva=086)      

 

 POTASSIUM (BEAKER) (test  3.4 meq/L  3.5-5.1  



 code=379)      

 

 CHLORIDE (BEAKER) (test  101 meq/L    



 code=382)      

 

 CO2 (BEAKER) (test  25 meq/L  22-29  



 code=355)      

 

 BLOOD UREA NITROGEN  17 mg/dL  7-21  



 (BEAKER) (test code=354)      

 

 CREATININE (BEAKER) (test  0.84 mg/dL  0.57-1.25  



 code=358)      

 

 GLUCOSE RANDOM (BEAKER)  144 mg/dL    



 (test code=652)      

 

 CALCIUM (BEAKER) (test  8.9 mg/dL  8.4-10.2  



 code=697)      

 

 EGFR (BEAKER) (test   mL/min/1.73 sq m    INSUFFICIENT CLINICAL DATA



 code=1092)      TO CALCULATE ESTIMATED



       GFR.



RAD, CHEST, 1 VIEW, NON [id number] 18:58:00Reason for exam:-&gt;CHEST 
PAINShould this be performed at the bedside?-&gt;YesFINAL REPORT PATIENT ID:   
[id number] Chest, 1 view Clinical history: CHEST PAIN Comparison: None Discussion
:  There is no pneumothorax or significant pleural effusion. There are patchy 
bibasilar opacities, left worse than right, suggestive of atelectasis or 
possibly evolving pneumonitis. The cardiac silhouette is enlarged with mild 
perihilar edema. No acute osseous abnormality. Chronic left rib fractures are 
noted. Signed: Sabra Schmidt MDReport Verified Date/Time:  2018 18:58:50 
Reading Location:[place] [id number] Ortho Consult Reading Room       Electronically 
signed by: SABRA SCHMIDT M.D. on 2018 06:58 PMCBC W/PLT COUNT &amp; AUTO 
[id number] 18:34:00





 Test Item  Value  Reference Range  Comments

 

 WHITE BLOOD CELL COUNT (BEAKER) (test code=775)  5.9 K/ L  3.5-10.5  

 

 RED BLOOD CELL COUNT (BEAKER) (test code=761)  5.36 M/ L  4.63-6.08  

 

 HEMOGLOBIN (BEAKER) (test code=410)  15.6 GM/DL  13.7-17.5  

 

 HEMATOCRIT (BEAKER) (test code=411)  44.5 %  40.1-51.0  

 

 MEAN CORPUSCULAR VOLUME (BEAKER) (test code=753)  83.0 fL  79.0-92.2  

 

 MEAN CORPUSCULAR HEMOGLOBIN (BEAKER) (test  29.1 pg  25.7-32.2  



 hjlb=013)      

 

 MEAN CORPUSCULAR HEMOGLOBIN CONC (BEAKER) (test  35.1 GM/DL  32.3-36.5  



 code=752)      

 

 RED CELL DISTRIBUTION WIDTH (BEAKER) (test  12.8 %  11.6-14.4  



 code=412)      

 

 PLATELET COUNT (BEAKER) (test code=756)  196 K/CU MM  150-450  

 

 MEAN PLATELET VOLUME (BEAKER) (test code=754)  9.8 fL  9.4-12.4  

 

 NUCLEATED RED BLOOD CELLS (BEAKER) (test  0 /100 WBC  0-0  



 code=413)      

 

 NEUTROPHILS RELATIVE PERCENT (BEAKER) (test  73 %    



 code=429)      

 

 LYMPHOCYTES RELATIVE PERCENT (BEAKER) (test  18 %    



 code=430)      

 

 MONOCYTES RELATIVE PERCENT (BEAKER) (test  9 %    



 code=431)      

 

 EOSINOPHILS RELATIVE PERCENT (BEAKER) (test  0 %    



 code=432)      

 

 BASOPHILS RELATIVE PERCENT (BEAKER) (test  0 %    



 code=437)      

 

 NEUTROPHILS ABSOLUTE COUNT (BEAKER) (test  4.31 K/ L  1.78-5.38  



 code=670)      

 

 LYMPHOCYTES ABSOLUTE COUNT (BEAKER) (test  1.06 K/ L  1.32-3.57  



 code=414)      

 

 MONOCYTES ABSOLUTE COUNT (BEAKER) (test  0.52 K/ L  0.30-0.82  



 code=415)      

 

 EOSINOPHILS ABSOLUTE COUNT (BEAKER) (test  0.01 K/ L  0.04-0.54  



 code=416)      

 

 BASOPHILS ABSOLUTE COUNT (BEAKER) (test  0.02 K/ L  0.01-0.08  



 code=417)      

 

 IMMATURE GRANULOCYTES-RELATIVE PERCENT (BEAKER)  0 %  0-1  



 (test code=2801)

## 2018-07-07 NOTE — ER
Nurse's Notes                                                                                     

 [facility]                                                                

Name: Gregory Cardozo                                                                                 

Age: 58 yrs                                                                                       

Sex: Male                                                                                         

: 1960                                                                                   

MRN: [id number]                                                                                   

Arrival Date: 2018                                                                          

Time: 20:38                                                                                       

Account#: [id number]                                                                            

Bed 3                                                                                             

Private MD:                                                                                       

Diagnosis: Altered mental status, unspecified;Abnormal electrocardiogram [ECG] [EKG]-left bundle, 

  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                          

  unspecified;Unspecified diastolic (congestive) heart failure                                    

                                                                                                  

Presentation:                                                                                     

                                                                                             

20:33 Presenting complaint: EMS states: they were toned out for report of pt having collapsed bb  

      at home and was unresponsive, pt had hx of recent MI 4 weeks ago and was stented, on        

      their arrival family had applied pt's life vest which shocked him x 2. On their arrival     

      they started CPR and applied their AED which shocked him x 4 he then had ROSC.              

      Transition of care: patient was not received from another setting of care. Onset of         

      symptoms was 2018. Risk Assessment: Do you want to hurt yourself or someone        

      else? Patient reports no desire to harm self or others. Initial Sepsis Screen: Does the     

      patient meet any 2 criteria? No. Patient's initial sepsis screen is negative. Does the      

      patient have a suspected source of infection? No. Patient's initial sepsis screen is        

      negative. Care prior to arrival: CPR performed by EMS was defibrillated IV initiated.       

      18 GA, in the left antecubital area, Glucose check: 152 Oxygen administered. via a          

      non-rebreather mask.                                                                        

20:33 Method Of Arrival: EMS: [facility]                                                      

20:33 Acuity: MERCEDES 1                                                                           bb  

                                                                                                  

Historical:                                                                                       

- Allergies:                                                                                      

21:02 No Known Allergies;                                                                     bb  

- Home Meds:                                                                                      

23:22 Plavix 75 mg Oral tab 1 tab once daily [Active];                                        aa1 

- PMHx:                                                                                           

23:22 Hypertension; Myocardial infarction;                                                    aa1 

- PSHx:                                                                                           

23:22 Heart stents;                                                                           aa1 

                                                                                                  

- Immunization history:: Adult Immunizations up to date.                                          

- Social history:: Smoking status: unknown.                                                       

- Family history:: not pertinent.                                                                 

- Ebola Screening: : No symptoms or risks identified at this time.                                

                                                                                                  

                                                                                                  

Screenin:07 Abuse screen: Denies threats or abuse. Nutritional screening: No deficits noted.        bb  

      Tuberculosis screening: No symptoms or risk factors identified. Fall Risk Secondary         

      diagnosis (15 points) IV access (20 points). Ambulatory Aid- None/Bed Rest/Nurse Assist     

      (0 pts). Mental Status- Overestimates/Forgets Limitations (15 pts.). Total Serrano Fall       

      Scale indicates High Risk Score (45 or more points). Fall prevention measures have been     

      instituted. Side Rails Up X 2 Frequent Obs/Assessments Occuring Family Present and          

      informed to notify staff if the need to leave the bedside.                                  

                                                                                                  

Assessment:                                                                                       

20:35 General: Appears distressed, Behavior is unresponsive. Pain: Unable to use pain scale.  jd3 

      Patient is unresponsive. Neuro: Level of Consciousness is unresponsive, Oriented to         

      none. Cardiovascular: Heart tones S1 S2 present Pulses are all present. Rhythm is sinus     

      rhythm with PACs. Respiratory: Airway nonrebreather in place Respiratory effort is          

      shallow, weak, Respiratory pattern is snoring Parent/caregiver reports the patient          

      having shortness of breath. Respiratory: Airway nonrebreather in place Respiratory          

      effort is shallow, weak, Respiratory pattern is snoring Parent/caregiver reports the        

      patient having shortness of breath. GI: Abdomen is round. : No signs and/or symptoms      

      were reported regarding the genitourinary system. EENT: No signs and/or symptoms were       

      reported regarding the EENT system. Derm: Skin is intact, Skin is diaphoretic, Skin is      

      pale, Skin temperature is warm.                                                             

21:00 Reassessment: Patient and/or family updated on plan of care and expected duration. Pain jd3 

      level reassessed. pt stating difficulty breathing, provider notified; pt awake, pt AXO      

      X 0, pt not answering any questions, pt is confused reporting no understanding.             

21:30 Reassessment: No changes from previously documented assessment. Patient and/or family   jd3 

      updated on plan of care and expected duration. Pain level reassessed. family at bedside.    

22:00 Reassessment: No changes from previously documented assessment. Patient and/or family   jd3 

      updated on plan of care and expected duration. Pain level reassessed.                       

22:30 Reassessment: No changes from previously documented assessment. Patient and/or family   jd3 

      updated on plan of care and expected duration. Pain level reassessed.                       

22:32 Reassessment: report given to Leeanne ESTRADA at [facility] in [place], [place].                  jd3 

22:57 General: Appears uncomfortable, Behavior is agitated, restless. Pain: Complains of pain jd3 

      in chest Unable to use pain scale. Does not appear to understand pain scale. Neuro:         

      Level of Consciousness is awake, confused, Oriented to none. Cardiovascular: Heart          

      tones S1 S2 present Pulses are all present. Rhythm is sinus rhythm with PACs.               

      Respiratory: Reports shortness of breath Airway is patent pt still with nonrebreather       

      in place Respiratory effort is even, labored, Respiratory pattern is regular,               

      symmetrical. GI: Abdomen is round Reports diarrhea. : No signs and/or symptoms were       

      reported regarding the genitourinary system. EENT: No signs and/or symptoms were            

      reported regarding the EENT system. Derm: Skin is intact, Skin is dry, Skin is normal,      

      Skin temperature is warm. Musculoskeletal: Circulation, motion, and sensation intact.       

      Range of motion: intact in all extremities.                                                 

                                                                                                  

Vital Signs:                                                                                      

20:38  / 95; Pulse 94; Resp 30 S; Pulse Ox 93% on 15% Non-rebreather mask; Weight       bb  

      122.47 kg; Height 6 ft. 0 in. (182.88 cm) (R);                                              

20:43  / 69; Pulse 90; Resp 32; Temp 97.7(C); Pulse Ox 93% on 15% Non-rebreather mask;  bb  

21:07  / 78; Pulse 90; Resp 21; Temp 97.8(C); Pulse Ox 91% on 15% Non-rebreather mask;  bb  

21:40  / 79; Pulse 90; Resp 20; Temp 97.6(C); Pulse Ox 92% on Nebulizer Mask;           aa1 

22:07  / 84; Pulse 95; Resp 19; Temp 98.0; Pulse Ox 94% on Non-rebreather mask;         jd3 

22:30  / 80; Pulse 95; Resp 20; Temp 98.0(C); Pulse Ox 94% on Non-rebreather mask;      aa1 

20:38 Body Mass Index 36.62 (122.47 kg, 182.88 cm)                                            bb  

                                                                                                  

ED Course:                                                                                        

20:35 Inserted saline lock: 18 gauge in right antecubital area, using aseptic technique.      bb  

      ,using aseptic technique. by Marlyn ED tech Blood collected.                               

20:38 Patient arrived in ED.                                                                  am2 

20:40 Drake cath inserted, using sterile technique, 16 Fr., by ED Tech, balloon inflated, to  bb  

      gravity drainage, Patient tolerated poorly.                                                 

20:54 Singh Watson MD is Attending Physician.                                             chuyita 

20:56 Basic Metabolic Panel Sent.                                                             jd3 

20:56 CBC with Diff Sent.                                                                     jd3 

20:56 Ckmb Sent.                                                                              jd3 

20:57 CPK Sent.                                                                               jd3 

20:57 LFT's Sent.                                                                             jd3 

20:57 Magnesium Sent.                                                                         jd3 

20:57 NT PRO-BNP Sent.                                                                        jd3 

20:57 PT-INR Sent.                                                                            jd3 

20:57 Ptt, Activated Sent.                                                                    jd3 

20:57 Troponin (emerg Dept Use Only) Sent.                                                    jd3 

20:59 XRAY Chest (1 view) In Process Unspecified.                                             EDMS

21:01 Triage completed.                                                                       bb  

21:02 Arm band placed on.                                                                     bb  

21:08 Patient has correct armband on for positive identification. Placed in gown. Bed in low  bb  

      position. Call light in reach. Side rails up X2. Adult w/ patient.                          

21:19 Brian Gibson, RN is Primary Nurse.                                                  jd3 

21:33 CT Head Brain wo Cont In Process Unspecified.                                           EDMS

22:20 Assisted with bedpan. Cleaned of incontinence. Linen changed.                           aa1 

22:33 No provider procedures requiring assistance completed. Patient transferred, IV remains  jd3 

      in place.                                                                                   

22:40 Assisted with bedpan. Cleaned of incontinence. Linen changed.                           aa1 

22:50 Cleaned of incontinence. Linen changed.                                                 aa1 

22:55 Fecal management system inserted rectally as pt has had 4+ episodes of large watery     aa1 

      stools and is unable to control his bowels.                                                 

                                                                                                  

Administered Medications:                                                                         

21:45 Drug: Heparin (MI-Bolus No thrombolytic) - HEParin 60 units/kg {Co-Signature: joyce        aa1 

      (Shana Bragg RN).} Route: IVP; Site: right antecubital;                                  

23:00 Follow up: Response: No adverse reaction; No change in condition                        aa1 

21:45 Drug: Heparin (MI Drip) 12 units/kg/hr - (HEParin 64967 units, D5W 500 ml)              aa1 

      {Co-Signature: joyce (Shana Bragg RN).} Route: IV; Rate: calculated rate; Site: right       

      antecubital;                                                                                

23:05 Follow up: IV Status: Infusion continued upon transfer                                  aa1 

21:45 Drug: Lasix 60 mg Route: IVP; Site: right antecubital;                                  aa1 

23:00 Follow up: Response: No adverse reaction; No change in condition                        aa1 

21:50 Drug: ProTONIX 40 mg Route: IVP; Site: right antecubital;                               aa1 

23:00 Follow up: Response: No adverse reaction; No change in condition                        aa1 

22:35 Drug: PlaVIX 75 mg Route: PO;                                                           aa1 

23:00 Follow up: Response: No adverse reaction; No change in condition                        aa1 

22:35 Drug: Aspirin Chewable Tablet 324 mg Route: PO;                                         aa1 

23:00 Follow up: Response: No adverse reaction; No change in condition                        aa1 

23:00 Not Given (Other Intervention Used): Aspirin Suppository 300 mg VA once                 aa1 

23:05 Not Given (pt left facility prior to administration): foLIC Acid 1 mg IVPB once         aa1 

                                                                                                  

                                                                                                  

Point of Care Testing:                                                                            

      Blood Glucose:                                                                              

21:02 Blood Glucose: 151 mg/dL;                                                               bb  

      Ranges:                                                                                     

                                                                                                  

Outcome:                                                                                          

21:39 ER care complete, transfer ordered by MD.                                               [facility] 

22:33 Transferred by helicopter to [facility], Transfer form completed.    jd3 

      X-rays sent w/ patient.                                                                     

22:33 Condition: stable                                                                           

22:33 Instructed on the need for transfer.                                                        

23:09 Patient left the ED.                                                                    aa1 

                                                                                                  

Signatures:                                                                                       

Dispatcher [employer]                           EDMS                                                 

Jeanne Boyd RN                        RN   aa1                                                  

Singh Watson MD MD cha Ballard, Brenda RN                     Saundra Zabala Jonathon, RN RN   jlana Bragg RN                            bb                                                   

                                                                                                  

Corrections: (The following items were deleted from the chart)                                    

21:05 21:02  / 95; Pulse 94bpm; Resp 30bpm; Spontaneous; Pulse Ox 93% 02 15%            bb  

      Non-rebreather mask; 122.47 kg; Height 6 ft. 0 in. Reported; BMI: 36.6; bb                  

                                                                                                  

**************************************************************************************************

## 2018-07-08 NOTE — EKG
Test Date:    2018-07-07               Test Time:    20:45:43

Technician:   CMC                                    

                                                     

MEASUREMENT RESULTS:                                       

Intervals:                                           

Rate:         91                                     

NV:           166                                    

QRSD:         156                                    

QT:           436                                    

QTc:          536                                    

Axis:                                                

P:            -4                                     

NV:           166                                    

QRS:          -29                                    

T:            181                                    

                                                     

INTERPRETIVE STATEMENTS:                                       

                                                     

Sinus rhythm with occasional premature ventricular complexes

Left bundle branch block

Abnormal ECG

Compared to ECG 07/07/2018 20:45:10

Left-axis deviation no longer present



Electronically Signed On 07-08-18 10:54:15 CDT by Prasad Martins

## 2018-07-08 NOTE — EKG
Test Date:    2018-07-07               Test Time:    20:45:10

Technician:   CMC                                    

                                                     

MEASUREMENT RESULTS:                                       

Intervals:                                           

Rate:         91                                     

FL:           152                                    

QRSD:         174                                    

QT:           448                                    

QTc:          551                                    

Axis:                                                

P:            24                                     

FL:           152                                    

QRS:          -31                                    

T:            146                                    

                                                     

INTERPRETIVE STATEMENTS:                                       

                                                     

Sinus rhythm with occasional premature ventricular complexes

Left axis deviation

Left bundle branch block

Abnormal ECG

No previous ECG available for comparison



Electronically Signed On 07-08-18 10:54:18 CDT by Prasad Martins

## 2020-01-01 NOTE — RAD REPORT
EXAM DESCRIPTION:  CT - Head Brain Wo Cont - 7/7/2018 9:32 pm

 

CLINICAL HISTORY:  Alteration of awareness. Confusion.

 

COMPARISON:  none

 

TECHNIQUE:  Computed axial tomography of the head was obtained. IV contrast was not requested.

 

All CT scans are performed using dose optimization technique as appropriate and may include automated
 exposure control or mA/KV adjustment according to patient size.

 

FINDINGS:  An intracranial  bleed is not seen .

 

The ventricles are normal in caliber.

 

No extra-axial fluid collection is noted.

 

The sinuses and mastoids are clear.

 

IMPRESSION:  No acute intracranial abnormality is seen. If patient's symptoms persist  MRI of the bra
in would be recommended. Statement Selected